# Patient Record
Sex: FEMALE | Race: WHITE | NOT HISPANIC OR LATINO
[De-identification: names, ages, dates, MRNs, and addresses within clinical notes are randomized per-mention and may not be internally consistent; named-entity substitution may affect disease eponyms.]

---

## 2022-04-26 ENCOUNTER — NON-APPOINTMENT (OUTPATIENT)
Age: 67
End: 2022-04-26

## 2022-04-27 ENCOUNTER — TRANSCRIPTION ENCOUNTER (OUTPATIENT)
Age: 67
End: 2022-04-27

## 2022-04-27 ENCOUNTER — NON-APPOINTMENT (OUTPATIENT)
Age: 67
End: 2022-04-27

## 2022-04-27 ENCOUNTER — APPOINTMENT (OUTPATIENT)
Dept: INTERNAL MEDICINE | Facility: CLINIC | Age: 67
End: 2022-04-27
Payer: MEDICARE

## 2022-04-27 VITALS — SYSTOLIC BLOOD PRESSURE: 100 MMHG | DIASTOLIC BLOOD PRESSURE: 60 MMHG

## 2022-04-27 VITALS — HEIGHT: 65 IN | WEIGHT: 132.4 LBS | TEMPERATURE: 98 F | BODY MASS INDEX: 22.06 KG/M2

## 2022-04-27 DIAGNOSIS — G89.29 DORSALGIA, UNSPECIFIED: ICD-10-CM

## 2022-04-27 DIAGNOSIS — R41.0 DISORIENTATION, UNSPECIFIED: ICD-10-CM

## 2022-04-27 DIAGNOSIS — E06.3 AUTOIMMUNE THYROIDITIS: ICD-10-CM

## 2022-04-27 DIAGNOSIS — Z78.9 OTHER SPECIFIED HEALTH STATUS: ICD-10-CM

## 2022-04-27 DIAGNOSIS — M54.9 DORSALGIA, UNSPECIFIED: ICD-10-CM

## 2022-04-27 DIAGNOSIS — E04.1 NONTOXIC SINGLE THYROID NODULE: ICD-10-CM

## 2022-04-27 DIAGNOSIS — Z00.00 ENCOUNTER FOR GENERAL ADULT MEDICAL EXAMINATION W/OUT ABNORMAL FINDINGS: ICD-10-CM

## 2022-04-27 PROCEDURE — G0439: CPT

## 2022-04-27 PROCEDURE — 99203 OFFICE O/P NEW LOW 30 MIN: CPT | Mod: 25

## 2022-04-27 RX ORDER — LEVOTHYROXINE SODIUM 0.03 MG/1
25 TABLET ORAL
Refills: 0 | Status: ACTIVE | COMMUNITY
Start: 2022-04-27

## 2022-04-27 RX ORDER — ASCORBIC ACID 500 MG
TABLET ORAL
Refills: 0 | Status: ACTIVE | COMMUNITY

## 2022-04-27 RX ORDER — CHROMIUM 200 MCG
1000 TABLET ORAL
Refills: 0 | Status: ACTIVE | COMMUNITY

## 2022-04-27 RX ORDER — MULTIVITAMIN
TABLET ORAL
Refills: 0 | Status: ACTIVE | COMMUNITY

## 2022-04-27 RX ORDER — DENOSUMAB 60 MG/ML
60 INJECTION SUBCUTANEOUS
Refills: 0 | Status: ACTIVE | COMMUNITY
Start: 2022-04-27

## 2022-04-27 NOTE — HEALTH RISK ASSESSMENT
[Never] : Never [Yes] : Yes [2 - 4 times a month (2 pts)] : 2-4 times a month (2 points) [1 or 2 (0 pts)] : 1 or 2 (0 points) [Never (0 pts)] : Never (0 points) [No] : In the past 12 months have you used drugs other than those required for medical reasons? No [No falls in past year] : Patient reported no falls in the past year [0] : 2) Feeling down, depressed, or hopeless: Not at all (0) [PHQ-2 Negative - No further assessment needed] : PHQ-2 Negative - No further assessment needed [Patient reported mammogram was normal] : Patient reported mammogram was normal [HIV test declined] : HIV test declined [Hepatitis C test declined] : Hepatitis C test declined [Alone] : lives alone [Retired] : retired [College] : College [] :  [# Of Children ___] : has [unfilled] children [Feels Safe at Home] : Feels safe at home [Reports normal functional visual acuity (ie: able to read med bottle)] : Reports normal functional visual acuity [Smoke Detector] : smoke detector [Carbon Monoxide Detector] : carbon monoxide detector [Safety elements used in home] : safety elements used in home [Seat Belt] :  uses seat belt [Sunscreen] : uses sunscreen [With Patient/Caregiver] : , with patient/caregiver [Patient reported PAP Smear was normal] : Patient reported PAP Smear was normal [Patient reported bone density results were abnormal] : Patient reported bone density results were abnormal [Patient reported colonoscopy was abnormal] : Patient reported colonoscopy was abnormal [de-identified] : socially  [de-identified] : squat,push ups,planks, walks 20min daily core workouts [de-identified] : well balanced  [[id number]] : 0 [Reports changes in hearing] : Reports no changes in hearing [Reports changes in vision] : Reports no changes in vision [Reports changes in dental health] : Reports no changes in dental health [Guns at Home] : no guns at home [Travel to Developing Areas] : does not  travel to developing areas [Caregiver Concerns] : does not have caregiver concerns [MammogramDate] : 01/2022 [MammogramComments] : Jose Armando Shaikh  [PapSmearDate] : 01/2022 [PapSmearComments] : Dr Anneliese Pina  [BoneDensityDate] : 12/2021 [ColonoscopyDate] : 4/15/19 [ColonoscopyComments] : f/u 10 years [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

## 2022-04-27 NOTE — HISTORY OF PRESENT ILLNESS
[FreeTextEntry1] : Establish care [de-identified] : Patient is a 66F with chronic lower back pain (from injury in 1970s) s/p spinal surgery, T9 compression fracture, osteoporosis (on Prolia), Hashimoto's hypothyroidism, thyroid nodule presents today to establish care and to discuss recent neurological episode. \par \par Recently started working with physiatrist Dr. Natalie Sofia conservatively with PT, but she has recommended injections (patient is hoping to hold off on this). Doing exercises at home to strengthen core \par \par Recently had an episode of fluttering in chest, intermittent, very short. No chest pain or SOB when this occurs x years. EKGs normal, but no Holter or Zio\par \par Recently has many ocular migraines (7x in a 3 week period). Last week had a fever 100.7. Felt confused, dissociated for a few hours. No headaches or blurry vision. No memory loss. Slight dizziness. Pressure in the head now\par \par Sees a therapist regularly\par See an osteopath every 2 weeks for spinal adjustments\par \par mammo 1/22\par PAP - 1/22\par colonoscopy- next due, Dr. Parker \par \par pneumonia 23, 13  @[facility]\par shingles @[facility]

## 2022-05-05 ENCOUNTER — TRANSCRIPTION ENCOUNTER (OUTPATIENT)
Age: 67
End: 2022-05-05

## 2022-05-06 ENCOUNTER — NON-APPOINTMENT (OUTPATIENT)
Age: 67
End: 2022-05-06

## 2022-05-06 ENCOUNTER — APPOINTMENT (OUTPATIENT)
Dept: CARDIOLOGY | Facility: CLINIC | Age: 67
End: 2022-05-06
Payer: MEDICARE

## 2022-05-06 VITALS
SYSTOLIC BLOOD PRESSURE: 128 MMHG | OXYGEN SATURATION: 98 % | HEART RATE: 67 BPM | BODY MASS INDEX: 21.97 KG/M2 | WEIGHT: 132 LBS | DIASTOLIC BLOOD PRESSURE: 72 MMHG

## 2022-05-06 DIAGNOSIS — Z87.891 PERSONAL HISTORY OF NICOTINE DEPENDENCE: ICD-10-CM

## 2022-05-06 PROCEDURE — 93246 EXT ECG>7D<15D RECORDING: CPT

## 2022-05-06 PROCEDURE — 99204 OFFICE O/P NEW MOD 45 MIN: CPT

## 2022-05-06 PROCEDURE — 93000 ELECTROCARDIOGRAM COMPLETE: CPT | Mod: 59

## 2022-05-08 PROBLEM — Z87.891 FORMER SMOKER: Status: ACTIVE | Noted: 2022-05-08

## 2022-05-08 NOTE — DISCUSSION/SUMMARY
[FreeTextEntry1] : Chest pain\par The working diagnosis is musculoskeletal chest pain. The history is compelling for this diagnosis. The differential diagnosis would include myocardial ischemia/atherosclerotic heart disease. The resting 5/06/22 electrocardiogram is abnormal consistent with a previous anterior septal wall myocardial infarction of indeterminate age. Prior tracings are not available for comparison. However, the patient does not have multiple risk factors for the development of atherosclerosis. A noninvasive cardiac evaluation would be helpful for management decisions.\par \par I have  recommended the following\par a. Prior cardiac records not available at this time are requested for review\par b. Noninvasive cardiac evaluation to include\par    1. exercise treadmill ECG study\par    2. echocardiogram\par c. further evaluation and treatment will be dictated by the results the above evaluation and the patient's clinical course\par \par \par \par Palpitations\par The working diagnosis is palpitations secondary to sinus tachycardia. The differential diagnosis would include an as yet undetected atrial arrhythmia. In the setting of presumed normal left ventricular systolic function the risk for  the development of a malignant ventricular arrhythmia is low..  An electrocardiogram  during symptoms would be most helpful for diagnosis.\par \par I  have  recommended the following\par a. Echocardiogram\par b  Zio patch 2 week mobile telemetry study\par \par \par \par Hyperlipidemia\par Hyperlipidemia represents a risk factor for atherosclerotic heart disease. The target LDL level for primary prevention is about 100. The ability of high HDL level to provide protection against cardiovascular events is controversial. The main clinical decision involves whether or not to institute antihyperlipidemic medical therapy. Nonpharmacological therapy, specifically diet and exercise are emphasized  as  major aspects of treatment\par \par I have recommended the following\par a. Low fat low cholesterol diet. Regular aerobic exercise to the extent possible\par b. Target LDL level to about 100 as discussed above\par c. Fasting lipid profile is ordered\par d. HMG-CoA reductase inhibitor therapy dependent on the response to above measures  and  the patient's clinical course.\par \par \par \par The diagnosis, prognosis, risks, options and alternatives were explained at length to the patient. All questions were answered. Issues discussed included chest pain palpitations cardiac arrhythmias atherosclerotic heart disease hyperlipidemia noninvasive cardiac testing diet and exercise

## 2022-05-08 NOTE — REVIEW OF SYSTEMS
[Feeling Fatigued] : feeling fatigued [Joint Pain] : joint pain [Negative] : Heme/Lymph [FreeTextEntry5] : see history of present illness [FreeTextEntry9] : back pain

## 2022-05-08 NOTE — PHYSICAL EXAM
[Normal] : normal conjunctiva [Normal S1, S2] : normal S1, S2 [Clear Lung Fields] : clear lung fields [Soft] : abdomen soft [Non Tender] : non-tender [Normal Bowel Sounds] : normal bowel sounds [No Rash] : no rash [No Focal Deficits] : no focal deficits [de-identified] : Appears fit in no distress lying flat [de-identified] : No neck vein distention. No carotid bruit [de-identified] : No murmur. No gallop and no diastolic sounds. [de-identified] : full range of motion [de-identified] : no peripheral edema. Dorsalis pedis pulses +1-2 bilaterally. Feet warm and well-perfused. No ulcerations. [de-identified] : pleasant

## 2022-05-08 NOTE — HISTORY OF PRESENT ILLNESS
[FreeTextEntry1] : 66-year-old female\par Cardiology consultation requested because of palpitations and chest pain\par \par \par Ms. Lock has no known heart disease. There is no history of myocardial infarction angina congestive heart failure or arrhythmia. Approximately 15 years ago she underwent noninvasive cardiac testing including a Holter monitor stress test with myocardial imaging and an echocardiogram. Those studies are not available at this time for review.\par \par Vanessa has chronic back pain resulting from compression fracture and previous spine surgery which prevents her from sitting. She has experienced anterior chest discomfort at rest without associated diaphoresis dyspnea or nausea. Independent of chest pain  Ms. Lock reports palpitations manifested by "butterflies"  There is no history of syncope or exertional chest pain\par \par There is a prior history of mild elevation of the cholesterol in the setting of high HDL levels. A lipid profile is not available at this time for review. There is no history of hypertension diabetes or family history of myocardial infarction/sudden death.  She smoked briefly only as a teenager.\par \par \par Vanessa presents today for cardiovascular evaluation

## 2022-05-17 ENCOUNTER — TRANSCRIPTION ENCOUNTER (OUTPATIENT)
Age: 67
End: 2022-05-17

## 2022-05-26 ENCOUNTER — APPOINTMENT (OUTPATIENT)
Dept: CARDIOLOGY | Facility: CLINIC | Age: 67
End: 2022-05-26
Payer: MEDICARE

## 2022-05-26 DIAGNOSIS — R07.9 CHEST PAIN, UNSPECIFIED: ICD-10-CM

## 2022-05-26 DIAGNOSIS — R00.2 PALPITATIONS: ICD-10-CM

## 2022-05-26 PROCEDURE — 93306 TTE W/DOPPLER COMPLETE: CPT

## 2022-05-26 PROCEDURE — 93246 EXT ECG>7D<15D RECORDING: CPT

## 2022-05-26 PROCEDURE — 36415 COLL VENOUS BLD VENIPUNCTURE: CPT

## 2022-05-30 LAB
CHOLEST SERPL-MCNC: 195 MG/DL
HDLC SERPL-MCNC: 63 MG/DL
LDLC SERPL CALC-MCNC: 114 MG/DL
NONHDLC SERPL-MCNC: 132 MG/DL
TRIGL SERPL-MCNC: 86 MG/DL

## 2022-06-19 ENCOUNTER — NON-APPOINTMENT (OUTPATIENT)
Age: 67
End: 2022-06-19

## 2022-06-20 ENCOUNTER — APPOINTMENT (OUTPATIENT)
Dept: CARDIOLOGY | Facility: CLINIC | Age: 67
End: 2022-06-20
Payer: MEDICARE

## 2022-06-20 PROCEDURE — 93015 CV STRESS TEST SUPVJ I&R: CPT

## 2022-06-22 ENCOUNTER — NON-APPOINTMENT (OUTPATIENT)
Age: 67
End: 2022-06-22

## 2022-06-22 ENCOUNTER — APPOINTMENT (OUTPATIENT)
Dept: NEUROLOGY | Facility: CLINIC | Age: 67
End: 2022-06-22
Payer: MEDICARE

## 2022-06-22 VITALS
TEMPERATURE: 98.7 F | HEART RATE: 91 BPM | OXYGEN SATURATION: 98 % | SYSTOLIC BLOOD PRESSURE: 114 MMHG | DIASTOLIC BLOOD PRESSURE: 76 MMHG | WEIGHT: 130 LBS | HEIGHT: 64.5 IN | BODY MASS INDEX: 21.93 KG/M2

## 2022-06-22 DIAGNOSIS — Z78.9 OTHER SPECIFIED HEALTH STATUS: ICD-10-CM

## 2022-06-22 PROCEDURE — 99205 OFFICE O/P NEW HI 60 MIN: CPT

## 2022-06-22 NOTE — REVIEW OF SYSTEMS
Patient notified script ready for      [As Noted in HPI] : as noted in HPI [Confused or Disoriented] : confusion [Numbness] : numbness [Abnormal Sensation] : an abnormal sensation [Difficulty Walking] : difficulty walking [Negative] : Heme/Lymph [de-identified] : prior suicide attempt in her 20's --- jumped from 4 stories

## 2022-06-22 NOTE — ASSESSMENT
[FreeTextEntry1] : 66 yr old female with remote history of sudden vision loss thought to be ocular migraine now presents with transietn episode of blurry peripheral vision followed by transient confusion during motors tasks. I noted no significant abnormality except for chronic weakness and sensory loss  in LLE attributed to prior injury. The difficulty in motor tasks she had is consistent with an apraxia suggesting cortical dysfunction. Her MRI brain only shows small punctate scattered hyperintensity which would not cause such symptoms \par \par Transient nature of these symptoms suggest this could have been a TIA or possible a complicated migraine. \par I advised the followign workup\par \par 1) screen for other stroke risk factor : large vessel atherosclerosis with MRA brain and MRA neck, atrial flutter or afib with long term heart monitor, will eliminate butter from her diet and recheck LDL in 4-6 weeks but may need low intensity statin to get LDL < 70 in the future, and start low dose aspirin per day\par \par 2) with very regiment diet and some proprioceptive loss on exam I will screen for b12 deficiency and hypothyroidism. \par \par

## 2022-06-22 NOTE — HISTORY OF PRESENT ILLNESS
[FreeTextEntry1] : 66 yr old female with remote history of ocular migraine ( 30 yr ago had episode where she could not see her  standing in front of her) , thoracolumbar traumatic compression fracture with intractable low back pain presents for initial visit after experiencing episode of blurry vision on 3/14/22 for about 30 min. She had a fever that night and felt she was dehydrated the following day. No further elevated temperatures except one in the middle of the night. The following morning was confused and had difficulty with motor tasks. She recalls holding a spoon in her hand and not knowing whether to place it in the sink or the refrigerator. She then went to her laptop and could not figure out how to use it. She became concerned and then went to test her calculation with a game she is use to playing and had no difficulty with that. Overall she felt this difficulty with motor tasks last 2-3 hours. She denies any headache with the episode. The symptoms have not reoccurred. \par \par She adhere to a regiment diet and only eats foods that are antiinflammatory. She monitors her weight and tries to do exercises up the stairs despite her intractable back pain which prevent her from sitting. She can only work standing up or lying down. She had chronic LLE weakness and numbness since her back surgery.

## 2022-06-22 NOTE — CONSULT LETTER
[Dear  ___] : Dear  [unfilled], [Consult Letter:] : I had the pleasure of evaluating your patient, [unfilled]. [( Thank you for referring [unfilled] for consultation for _____ )] : Thank you for referring [unfilled] for consultation for [unfilled] [Please see my note below.] : Please see my note below. [Consult Closing:] : Thank you very much for allowing me to participate in the care of this patient.  If you have any questions, please do not hesitate to contact me. [DrAbdelrahman  ___] : Dr. ROLLINS

## 2022-06-22 NOTE — DATA REVIEWED
[de-identified] : 5-2-22 MRI brain \par brain and ural architecture normal; no restricted diffusion; few small scattered flair hyperintensity in cerebral white matter with cystic focus in left corona radiata; no hemorrhage; normal sinuses; flow voids grossly patent   [de-identified] : 5-26-22 Lipid panel chol 195, tg 86, \par 5-26-22 Holter event monitor: rare successive atrial ectopy noted ; HR sinus  min \par 5-26-22 TTE ; LV, RV size and function normal; LVEF 58%; mild mitral valve regurgitation

## 2022-06-22 NOTE — PHYSICAL EXAM
[FreeTextEntry1] : Mental status:\par Orientation: Alert , oriented to month, day of week, year, location                                                                            Speech is fluent , able to name objects, repeat a sentence and write a sentence                                                  Memory: Short term tested by registering  3 objects and recalled 3/3 in 5 min; Long term memory intact based on correct recall of past events and demographic details.                                                                                     Concentration: able to do serial 7 calculation  5/5 and spell world backwards                                                  Comprehension : able follow 3 step requests                                                                                                         Apraxia and visuospatial able to draw clock drawing and intersecting pentagon                                              Neglect is absent                                                                                                                                                      Agnosia is absent                                                                                                                                         \par MMSE 30/30\par Cranial nerves:\par CN I deferred. CN  II VFF; fundus exam benign; III, IV, VI: PERRLA, EOM IV: Facial sensation normal B/L to touch, pinprick and temperatureVII:Facial strength normal B/L. VIII: Gross hearing intact IX, X: palate midline and elevates symmetrically XI: Trapezius normal strength, XII: Tongue midline without atrophy or fasciculation\par Motor: Muscle tone no rigidity or resistance in all 4 extremities. No atrophy or fasciculation. Muscle strength: arms and legs, proximal and distal flexors and extensors are normal 5/5 except LLE ilipsoas 4/5 and cannot dorsiflex foot due to prior foot drop . No UE drift.\par Sensory: reduce pinprick over dorsum of left foot ; mild loss of propriception at toes \par Reflexes: all present, normal, and symmetrical 1+\par Coordination: finger to nose: normal. Heel to shin: normal\par Gait: steady normal based ; Romberg test negative \par \par

## 2022-07-07 ENCOUNTER — RESULT REVIEW (OUTPATIENT)
Age: 67
End: 2022-07-07

## 2022-07-08 LAB
HCYS SERPL-MCNC: 8 UMOL/L
T4 SERPL-MCNC: 7.7 UG/DL
TSH SERPL-ACNC: 3.42 UIU/ML
VIT B12 SERPL-MCNC: 885 PG/ML

## 2022-07-13 LAB — METHYLMALONATE SERPL-SCNC: 114 NMOL/L

## 2022-07-27 ENCOUNTER — TRANSCRIPTION ENCOUNTER (OUTPATIENT)
Age: 67
End: 2022-07-27

## 2022-08-03 ENCOUNTER — TRANSCRIPTION ENCOUNTER (OUTPATIENT)
Age: 67
End: 2022-08-03

## 2022-08-08 ENCOUNTER — TRANSCRIPTION ENCOUNTER (OUTPATIENT)
Age: 67
End: 2022-08-08

## 2022-08-09 ENCOUNTER — APPOINTMENT (OUTPATIENT)
Dept: CARDIOLOGY | Facility: CLINIC | Age: 67
End: 2022-08-09

## 2022-08-09 VITALS
SYSTOLIC BLOOD PRESSURE: 90 MMHG | HEIGHT: 64.5 IN | WEIGHT: 128 LBS | HEART RATE: 96 BPM | BODY MASS INDEX: 21.59 KG/M2 | OXYGEN SATURATION: 96 % | DIASTOLIC BLOOD PRESSURE: 54 MMHG

## 2022-08-09 DIAGNOSIS — Z01.810 ENCOUNTER FOR PREPROCEDURAL CARDIOVASCULAR EXAMINATION: ICD-10-CM

## 2022-08-09 PROCEDURE — 99214 OFFICE O/P EST MOD 30 MIN: CPT

## 2022-08-09 PROCEDURE — 93000 ELECTROCARDIOGRAM COMPLETE: CPT

## 2022-08-10 ENCOUNTER — NON-APPOINTMENT (OUTPATIENT)
Age: 67
End: 2022-08-10

## 2022-08-10 PROBLEM — Z01.810 PREOPERATIVE CARDIOVASCULAR EXAMINATION: Status: ACTIVE | Noted: 2022-08-10

## 2022-08-10 NOTE — DISCUSSION/SUMMARY
[FreeTextEntry1] : Preoperative cardiovascular examination/  Transient ischemic attack\par \par In 5/22 chest pain and palpitations led to a noninvasive cardiac evaluation. An echocardiogram revealed a left ventricular ejection fraction of 58% and no significant valvular pathology. A stress treadmill ECG study was normal. A two-week Zio patch mobile telemetry study showed sinus rhythm /minute with no arrhythmia..\par \par In 7/22 an episode of confusion and difficulty with motor tasks  led to a neurological evaluation by Dr. Levine.\par An MRA of the neck showed no carotid stenosis. An MRI of the brain showed minimal small vessel ischemic changes. Rosaline was diagnosed as having a transient ischemic attack. Due to concerns for a cardioembolic etiology further evaluation for the presence of atrial fibrillation was recommended. Dr. Levine has  advised Rosaline to undergo long-term telemetry monitoring in the form of an implantable cardiac loop recorder .\par \par  Informed consent was obtained from the patient. Risks including but not limited to infection dislodgment requirements for  repositioning/reoperation were all explained. All questions were answered\par \par I have recommended the following\par a. 8/18/22 implantable cardiac loop recorder\par \par \par \par Hyperlipidemia\par Hyperlipidemia represents a risk factor for atherosclerotic heart disease. There is no evidence of such today. A 6/22 exercise treadmill ECG study was normal.   The resting 8/22 electrocardiogram  shows no evidence of myocardial ischemia or infarction  The target LDL level for primary prevention is about 100. In 5/22 the serum cholesterol level was 195 HDL 63 triglycerides 86 and   The ability of high HDL level to provide protection against cardiovascular events is controversial. The main clinical decision involves whether or not to institute antihyperlipidemic medical therapy. Nonpharmacological therapy, specifically diet and exercise are emphasized  as  major aspects of treatment\par \par I have recommended the following\par a. Low fat low cholesterol diet. Regular aerobic exercise to the extent possible\par b. Target LDL level to about 100 as discussed above\par c. HMG-CoA reductase inhibitor therapy dependent on the response to above measures  and  the patient's clinical course.\par \par \par \par The diagnosis, prognosis, risks, options and alternatives were explained at length to the patient. All questions were answered. Issues discussed included implantable cardiac loop recorder atrial fibrillation   anticoagulation  cardiac arrhythmias atherosclerotic heart disease hyperlipidemia noninvasive cardiac testing diet and exercise\par \par \par \par Counseling and/or coordination of care\par Time was a significant factor for this patient encounter. Total time spent with the patient was 30 minutes. Greater than 50% of the time was devoted to counseling and/or coordination of care .

## 2022-08-10 NOTE — HISTORY OF PRESENT ILLNESS
[FreeTextEntry1] : 67-year-old female\par Preoperative cardiovascular examination\par \par In 5/22 chest pain and palpitations led to a noninvasive cardiac evaluation. An echocardiogram revealed a left ventricular ejection fraction of 58% and no significant valvular pathology. A stress treadmill ECG study was normal. A two-week Zio patch mobile telemetry study showed sinus rhythm /minute with no arrhythmia..\par \par \par \par In 7/22 an episode of confusion and difficulty with motor test led to a neurological evaluation by Dr. Levine.\par An MRA of the neck showed no carotid stenosis. An MRI of the brain showed minimal small vessel ischemic changes. Rosaline was diagnosed as having a transient ischemic attack. Due to concerns for a cardioembolic etiology further evaluation for the presence of atrial fibrillation was recommended. Dr. Levine has  advised Rosaline to undergo long-term telemetry monitoring in the form of an implantable cardiac loop recorder .

## 2022-08-10 NOTE — PHYSICAL EXAM
[Normal] : normal conjunctiva [Normal S1, S2] : normal S1, S2 [Clear Lung Fields] : clear lung fields [Soft] : abdomen soft [Non Tender] : non-tender [Normal Bowel Sounds] : normal bowel sounds [No Rash] : no rash [No Focal Deficits] : no focal deficits [de-identified] : Appears fit in no distress lying flat [de-identified] : No neck vein distention. No carotid bruit [de-identified] : No murmur. No gallop and no diastolic sounds. [de-identified] : full range of motion [de-identified] : no peripheral edema. Dorsalis pedis pulses +1-2 bilaterally. Feet warm and well-perfused. No ulcerations. [de-identified] : pleasant

## 2022-08-12 ENCOUNTER — APPOINTMENT (OUTPATIENT)
Dept: CARDIOLOGY | Facility: CLINIC | Age: 67
End: 2022-08-12

## 2022-08-12 PROCEDURE — 36415 COLL VENOUS BLD VENIPUNCTURE: CPT

## 2022-08-15 ENCOUNTER — RESULT REVIEW (OUTPATIENT)
Age: 67
End: 2022-08-15

## 2022-08-15 LAB
ANION GAP SERPL CALC-SCNC: 13 MMOL/L
BASOPHILS # BLD AUTO: 0.04 K/UL
BASOPHILS NFR BLD AUTO: 0.6 %
BUN SERPL-MCNC: 16 MG/DL
CALCIUM SERPL-MCNC: 10 MG/DL
CHLORIDE SERPL-SCNC: 103 MMOL/L
CO2 SERPL-SCNC: 25 MMOL/L
CREAT SERPL-MCNC: 0.71 MG/DL
EGFR: 93 ML/MIN/1.73M2
EOSINOPHIL # BLD AUTO: 0.06 K/UL
EOSINOPHIL NFR BLD AUTO: 0.9 %
GLUCOSE SERPL-MCNC: 99 MG/DL
HCT VFR BLD CALC: 45.3 %
HGB BLD-MCNC: 13.7 G/DL
IMM GRANULOCYTES NFR BLD AUTO: 0.4 %
INR PPP: 0.99 RATIO
LYMPHOCYTES # BLD AUTO: 2.33 K/UL
LYMPHOCYTES NFR BLD AUTO: 34.4 %
MAN DIFF?: NORMAL
MCHC RBC-ENTMCNC: 30.2 GM/DL
MCHC RBC-ENTMCNC: 30.4 PG
MCV RBC AUTO: 100.4 FL
MONOCYTES # BLD AUTO: 0.39 K/UL
MONOCYTES NFR BLD AUTO: 5.8 %
NEUTROPHILS # BLD AUTO: 3.93 K/UL
NEUTROPHILS NFR BLD AUTO: 57.9 %
PLATELET # BLD AUTO: 211 K/UL
POTASSIUM SERPL-SCNC: 4.9 MMOL/L
PT BLD: 11.5 SEC
RBC # BLD: 4.51 M/UL
RBC # FLD: 14.4 %
SODIUM SERPL-SCNC: 140 MMOL/L
WBC # FLD AUTO: 6.78 K/UL

## 2022-08-18 ENCOUNTER — APPOINTMENT (OUTPATIENT)
Dept: CARDIOLOGY | Facility: CLINIC | Age: 67
End: 2022-08-18

## 2022-08-18 ENCOUNTER — TRANSCRIPTION ENCOUNTER (OUTPATIENT)
Age: 67
End: 2022-08-18

## 2022-09-06 ENCOUNTER — APPOINTMENT (OUTPATIENT)
Dept: CARDIOLOGY | Facility: CLINIC | Age: 67
End: 2022-09-06

## 2022-09-06 VITALS
DIASTOLIC BLOOD PRESSURE: 69 MMHG | WEIGHT: 131 LBS | OXYGEN SATURATION: 96 % | HEART RATE: 88 BPM | SYSTOLIC BLOOD PRESSURE: 105 MMHG | HEIGHT: 64.5 IN | BODY MASS INDEX: 22.09 KG/M2

## 2022-09-06 PROCEDURE — 99213 OFFICE O/P EST LOW 20 MIN: CPT

## 2022-09-06 PROCEDURE — 93290 INTERROG DEV EVAL ICPMS IP: CPT

## 2022-09-07 ENCOUNTER — APPOINTMENT (OUTPATIENT)
Dept: NEUROLOGY | Facility: CLINIC | Age: 67
End: 2022-09-07

## 2022-09-07 VITALS
DIASTOLIC BLOOD PRESSURE: 69 MMHG | HEIGHT: 64.5 IN | WEIGHT: 131 LBS | HEART RATE: 66 BPM | SYSTOLIC BLOOD PRESSURE: 110 MMHG | BODY MASS INDEX: 22.09 KG/M2

## 2022-09-07 DIAGNOSIS — Z80.0 FAMILY HISTORY OF MALIGNANT NEOPLASM OF DIGESTIVE ORGANS: ICD-10-CM

## 2022-09-07 DIAGNOSIS — Z83.3 FAMILY HISTORY OF DIABETES MELLITUS: ICD-10-CM

## 2022-09-07 DIAGNOSIS — Z80.3 FAMILY HISTORY OF MALIGNANT NEOPLASM OF BREAST: ICD-10-CM

## 2022-09-07 DIAGNOSIS — Z84.89 FAMILY HISTORY OF OTHER SPECIFIED CONDITIONS: ICD-10-CM

## 2022-09-07 DIAGNOSIS — Z63.5 DISRUPTION OF FAMILY BY SEPARATION AND DIVORCE: ICD-10-CM

## 2022-09-07 DIAGNOSIS — Z78.9 OTHER SPECIFIED HEALTH STATUS: ICD-10-CM

## 2022-09-07 DIAGNOSIS — Z80.7 FAMILY HISTORY OF OTHER MALIGNANT NEOPLASMS OF LYMPHOID, HEMATOPOIETIC AND RELATED TISSUES: ICD-10-CM

## 2022-09-07 PROCEDURE — 99215 OFFICE O/P EST HI 40 MIN: CPT

## 2022-09-07 SDOH — SOCIAL STABILITY - SOCIAL INSECURITY: DISRUPTION OF FAMILY BY SEPARATION AND DIVORCE: Z63.5

## 2022-09-08 NOTE — DISCUSSION/SUMMARY
[FreeTextEntry1] : Transient ischemic attack\par In 7/22 an episode of confusion and difficulty with motor tasks led to a neurological evaluation by Dr. Levine. An MRA of the neck showed no carotid stenosis an MRI of the brain showed minimal small vessel ischemic changes. Rosaline was diagnosed as having a transient ischemic attack.   A  5/22  Zio  patch telemetry study showed sinus rhythm /minute. No arrhythmia was seen. Due to concerns for a cardioembolic etiology an implantable cardiac loop recorder was inserted in 8/22. Interrogation of the  device today shows normal sinus rhythm. No arrhythmia. No atrial fibrillation is detected.  Anticoagulation  is not  indicated in the absence of documented  atrial  fibrillation.\par \par I  have  recommended the following:\par a.re-interrogation of the implantable cardiac loop recorder in 3 months\par \par \par Hyperlipidemia\par Hyperlipidemia represents a risk factor for atherosclerotic heart disease. There is no evidence of such today. A 6/22 exercise treadmill ECG study was normal.   The resting 8/22 electrocardiogram  shows no evidence of myocardial ischemia or infarction  The target LDL level for primary prevention is about 100. In 5/22 the serum cholesterol level was 195 HDL 63 triglycerides 86 and   The ability of high HDL level to provide protection against cardiovascular events is controversial. The main clinical decision involves whether or not to institute antihyperlipidemic medical therapy. Nonpharmacological therapy, specifically diet and exercise are emphasized  as  major aspects of treatment\par \par I have recommended the following\par a. Low fat low cholesterol diet. Regular aerobic exercise to the extent possible\par b. Target LDL level to about 100 as discussed above\par c. HMG-CoA reductase inhibitor therapy dependent on the response to above measures  and  the patient's clinical course.\par d. Routine laboratory studies including lipid profile  through  primary care Dr. Soliz\par \par \par \par \par Valvular heart disease\par The 5/22 echocardiogram revealed mild mitral insufficiency. The pulmonary systolic pressure was normal at 22 mmHg. The  left  ventricular  ejection fraction was 58%. The present cardiac physical examination is not suggestive of hemodynamically significant valvular pathology. In view of the lack of symptoms, absence of heart failure and clinical course continued monitoring without intervention is indicated this time.\par \par I recommended the following\par a  no further cardiac testing for this problem at this time.\par \par \par \par \par The diagnosis, prognosis, risks, options and alternatives were explained at length to the patient. All questions were answered. Issues discussed included implantable cardiac loop recorder atrial fibrillation   anticoagulation  cardiac arrhythmias atherosclerotic heart disease hyperlipidemia noninvasive cardiac testing diet and exercise\par \par \par \par Counseling and/or coordination of care\par Time was a significant factor for this patient encounter. Total time spent with the patient was  25  minutes. Greater than 50% of the time was devoted to counseling and/or coordination of care .

## 2022-09-08 NOTE — REVIEW OF SYSTEMS
[Feeling Fatigued] : feeling fatigued [Negative] : Heme/Lymph [FreeTextEntry5] : see history of present illness [FreeTextEntry9] : back pain

## 2022-09-08 NOTE — HISTORY OF PRESENT ILLNESS
[FreeTextEntry1] : 67-year-old female\par Routine followup hospital\par Patient underwent implantation of an implantable cardiac loop recorder in  8/22 without complications. She presently denies symptoms of chest pain palpitations shortness of breath or syncope.

## 2022-09-08 NOTE — PHYSICAL EXAM
[Normal] : normal conjunctiva [Normal S1, S2] : normal S1, S2 [Clear Lung Fields] : clear lung fields [Soft] : abdomen soft [Non Tender] : non-tender [Normal Bowel Sounds] : normal bowel sounds [No Rash] : no rash [No Focal Deficits] : no focal deficits [de-identified] : Appears fit in no distress lying flat [de-identified] : No neck vein distention. No carotid bruit [de-identified] : No murmur. No gallop and no diastolic sounds. [de-identified] : full range of motion.  Implantable loop recorder palpable left sternal border. Incision site healed [de-identified] : no peripheral edema. Dorsalis pedis pulses +1-2 bilaterally. Feet warm and well-perfused. No ulcerations. [de-identified] : pleasant

## 2022-09-09 NOTE — HISTORY OF PRESENT ILLNESS
[FreeTextEntry1] : 66 yr old female with remote history of ocular migraine ( 30 yr ago had episode where she could not see her  standing in front of her) , thoracolumbar traumatic compression fracture with intractable low back pain presents for initial visit after experiencing episode of blurry vision on 3/14/22 for about 30 min. She had a fever that night and felt she was dehydrated the following day. No further elevated temperatures except one in the middle of the night. The following morning was confused and had difficulty with motor tasks. She recalls holding a spoon in her hand and not knowing whether to place it in the sink or the refrigerator. She then went to her laptop and could not figure out how to use it. She became concerned and then went to test her calculation with a game she is use to playing and had no difficulty with that. Overall she felt this difficulty with motor tasks last 2-3 hours. She denies any headache with the episode. The symptoms have not reoccurred. \par \par She adhere to a regiment diet and only eats foods that are antiinflammatory. She monitors her weight and tries to do exercises up the stairs despite her intractable back pain which prevent her from sitting. She can only work standing up or lying down. She had chronic LLE weakness and numbness since her back surgery. \par \par Since last visit no further transient neurologic events\par She has zio patch that showed some atrial ectopy so now has ILR since Aug 22\par She did not start asprin \par She has made modifications to her diet but has not incorporated daily exercise

## 2022-09-09 NOTE — ASSESSMENT
[FreeTextEntry1] : 66 yr old female with remote history of sudden vision loss thought to be ocular migraine now presents with transietn episode of blurry peripheral vision followed by transient confusion during motors tasks. I noted no significant abnormality except for chronic weakness and sensory loss  in LLE attributed to prior injury. The difficulty in motor tasks she had is consistent with an apraxia suggesting cortical dysfunction. Her MRI brain only shows small punctate scattered hyperintensity which would not cause such symptoms \par \par Transient nature of these symptoms suggest this could have been a TIA or possible a complicated migraine. \par I advised the following workup\par \par 1) start aspirin 8 1mg per day\par 2) continue ILR montioring\par 3) MRA brain since no hemodynamic stenosis noted on MRA neck \par 4) recheck Lipid panel and if > 70 then will discuss adding statin \par \par

## 2022-09-09 NOTE — DATA REVIEWED
[de-identified] : 5-2-22 MRI brain \par brain and ural architecture normal; no restricted diffusion; few small scattered flair hyperintensity in cerebral white matter with cystic focus in left corona radiata; no hemorrhage; normal sinuses; flow voids grossly patent  \par \par 7/7/22 MRA neck no carotid stenosis  [de-identified] : 5-26-22 Lipid panel chol 195, tg 86, \par 5-26-22 Holter event monitor: rare successive atrial ectopy noted ; HR sinus  min \par 5-26-22 TTE ; LV, RV size and function normal; LVEF 58%; mild mitral valve regurgitation \par 6/22/22 B12 883

## 2022-09-09 NOTE — REVIEW OF SYSTEMS
[As Noted in HPI] : as noted in HPI [Confused or Disoriented] : confusion [Numbness] : numbness [Abnormal Sensation] : an abnormal sensation [Difficulty Walking] : difficulty walking [Negative] : Heme/Lymph [de-identified] : prior suicide attempt in her 20's --- jumped from 4 stories

## 2022-09-12 NOTE — DATA REVIEWED
[de-identified] : 5-2-22 MRI brain \par brain and ural architecture normal; no restricted diffusion; few small scattered flair hyperintensity in cerebral white matter with cystic focus in left corona radiata; no hemorrhage; normal sinuses; flow voids grossly patent   [de-identified] : 5-26-22 Lipid panel chol 195, tg 86, \par 5-26-22 Holter event monitor: rare successive atrial ectopy noted ; HR sinus  min \par 5-26-22 TTE ; LV, RV size and function normal; LVEF 58%; mild mitral valve regurgitation

## 2022-10-28 DIAGNOSIS — H53.129 TRANSIENT VISUAL LOSS, UNSPECIFIED EYE: ICD-10-CM

## 2022-10-28 DIAGNOSIS — H53.9 UNSPECIFIED VISUAL DISTURBANCE: ICD-10-CM

## 2022-10-30 ENCOUNTER — RESULT REVIEW (OUTPATIENT)
Age: 67
End: 2022-10-30

## 2022-11-14 LAB
CHOLEST SERPL-MCNC: 193 MG/DL
HDLC SERPL-MCNC: 69 MG/DL
LDLC SERPL CALC-MCNC: 108 MG/DL
NONHDLC SERPL-MCNC: 124 MG/DL
TRIGL SERPL-MCNC: 78 MG/DL

## 2022-11-16 ENCOUNTER — APPOINTMENT (OUTPATIENT)
Dept: NEUROLOGY | Facility: CLINIC | Age: 67
End: 2022-11-16

## 2022-11-16 VITALS
HEIGHT: 64.5 IN | BODY MASS INDEX: 21.93 KG/M2 | WEIGHT: 130 LBS | HEART RATE: 98 BPM | DIASTOLIC BLOOD PRESSURE: 68 MMHG | SYSTOLIC BLOOD PRESSURE: 104 MMHG | OXYGEN SATURATION: 96 %

## 2022-11-16 VITALS
HEIGHT: 64.5 IN | SYSTOLIC BLOOD PRESSURE: 104 MMHG | DIASTOLIC BLOOD PRESSURE: 68 MMHG | BODY MASS INDEX: 21.93 KG/M2 | WEIGHT: 130 LBS

## 2022-11-16 DIAGNOSIS — M51.9 UNSPECIFIED THORACIC, THORACOLUMBAR AND LUMBOSACRAL INTERVERTEBRAL DISC DISORDER: ICD-10-CM

## 2022-11-16 DIAGNOSIS — G43.109 MIGRAINE WITH AURA, NOT INTRACTABLE, W/OUT STATUS MIGRAINOSUS: ICD-10-CM

## 2022-11-16 DIAGNOSIS — S32.000A WEDGE COMPRESSION FRACTURE OF UNSPECIFIED LUMBAR VERTEBRA, INITIAL ENCOUNTER FOR CLOSED FRACTURE: ICD-10-CM

## 2022-11-16 DIAGNOSIS — Z86.39 PERSONAL HISTORY OF OTHER ENDOCRINE, NUTRITIONAL AND METABOLIC DISEASE: ICD-10-CM

## 2022-11-16 DIAGNOSIS — Z86.79 PERSONAL HISTORY OF OTHER DISEASES OF THE CIRCULATORY SYSTEM: ICD-10-CM

## 2022-11-16 PROCEDURE — 99214 OFFICE O/P EST MOD 30 MIN: CPT

## 2022-11-16 RX ORDER — KRILL/OM-3/DHA/EPA/PHOSPHO/AST 1000-230MG
81 CAPSULE ORAL
Refills: 0 | Status: ACTIVE | COMMUNITY

## 2022-11-16 RX ORDER — CYCLOSPORINE 0.5 MG/ML
0.05 EMULSION OPHTHALMIC
Refills: 0 | Status: ACTIVE | COMMUNITY

## 2022-11-16 NOTE — DATA REVIEWED
[de-identified] : 5-2-22 MRI brain \par brain and ural architecture normal; no restricted diffusion; few small scattered flair hyperintensity in cerebral white matter with cystic focus in left corona radiata; no hemorrhage; normal sinuses; flow voids grossly patent  \par \par 7/7/22 MRA neck no carotid stenosis  [de-identified] : 5-26-22 Lipid panel chol 195, tg 86, \par 5-26-22 Holter event monitor: rare successive atrial ectopy noted ; HR sinus  min \par 5-26-22 TTE ; LV, RV size and function normal; LVEF 58%; mild mitral valve regurgitation \par 6/22/22 B12 883\par 11-10-22 Lipid panel , chol 193 HDL 69\par \par ILR reports thus far no afib or flutter seen

## 2022-11-16 NOTE — REVIEW OF SYSTEMS
[As Noted in HPI] : as noted in HPI [Confused or Disoriented] : confusion [Numbness] : numbness [Abnormal Sensation] : an abnormal sensation [Difficulty Walking] : difficulty walking [Negative] : Heme/Lymph [de-identified] : prior suicide attempt in her 20's --- jumped from 4 stories

## 2022-11-16 NOTE — PHYSICAL EXAM
[FreeTextEntry1] : Mental status:\par Orientation: Alert , oriented to month, day of week, year, location                                                                            Speech is fluent , able to name objects, repeat a sentence and write a sentence                                                  Memory: Short term tested by registering  3 objects and recalled 3/3 in 5 min; Long term memory intact based on correct recall of past events and demographic details.                                                                                     Concentration: able to do serial 7 calculation  5/5 and spell world backwards                                                  Comprehension : able follow 3 step requests                                                                                                         Apraxia and visuospatial able to draw clock drawing and intersecting pentagon                                              Neglect is absent                                                                                                                                                      Agnosia is absent                                                                                                                                         \par MMSE 30/30\par Cranial nerves:\par CN I deferred. CN  II VFF; fundus exam benign; III, IV, VI: PERRLA, EOM IV: Facial sensation normal B/L to touch, pinprick and temperature VII:Facial strength normal B/L. VIII: Gross hearing intact IX, X: palate midline and elevates symmetrically XI: Trapezius normal strength, XII: Tongue midline without atrophy or fasciculation\par Motor: Muscle tone no rigidity or resistance in all 4 extremities. No atrophy or fasciculation. Muscle strength: arms and legs, proximal and distal flexors and extensors are normal 5/5 except LLE ilipsoas 4/5 and cannot dorsiflex foot due to prior foot drop . No UE drift.\par Sensory: reduce pinprick over dorsum of left foot ; mild loss of proprioception at toes \par Reflexes: all present, normal, and symmetrical 1+\par Coordination: finger to nose: normal. Heel to shin: normal\par Gait: steady normal based ; Romberg test negative \par \par

## 2022-11-16 NOTE — HISTORY OF PRESENT ILLNESS
[FreeTextEntry1] : Patient presents for follow up for stroke prevention\par Dec 16 had 15 min episode of dizziness that came on while in bed and then she noted she felt like she was on about. The Symptoms resolved in 15 min. Denies any additional focal deficits with this event. \par She is compliant with aspirin and reports no side effects \par

## 2022-11-16 NOTE — ASSESSMENT
[FreeTextEntry1] : 66 yr old female with remote history of sudden vision loss thought to be ocular migraine now presents with transient episode of blurry peripheral vision followed by transient confusion during motors tasks. I noted no significant abnormality except for chronic weakness and sensory loss  in LLE attributed to prior injury. The difficulty in motor tasks she had is consistent with an apraxia suggesting cortical dysfunction. Her MRI brain only shows small punctate scattered hyperintensity which would not cause such symptoms \par \par Transient nature of these symptoms suggest this could have been a TIA or possible a complicated migraine. \par I advised the following workup\par \par 1) continue aspirin \par 2) no atherosclerosis disease noted in intracranial or extracranial vessels\par 3) continue ILR to look for afib/flutter\par 4)  in patient with no atherosclerosis or cardiac disease --- will check Lp(a) level to see if we should be more aggressive \par

## 2023-03-01 ENCOUNTER — APPOINTMENT (OUTPATIENT)
Dept: NEUROLOGY | Facility: CLINIC | Age: 68
End: 2023-03-01
Payer: MEDICARE

## 2023-03-01 VITALS
HEART RATE: 101 BPM | DIASTOLIC BLOOD PRESSURE: 66 MMHG | HEIGHT: 64.5 IN | WEIGHT: 130 LBS | SYSTOLIC BLOOD PRESSURE: 100 MMHG | BODY MASS INDEX: 21.93 KG/M2

## 2023-03-01 DIAGNOSIS — Z86.73 PERSONAL HISTORY OF TRANSIENT ISCHEMIC ATTACK (TIA), AND CEREBRAL INFARCTION W/OUT RESIDUAL DEFICITS: ICD-10-CM

## 2023-03-01 PROCEDURE — 99215 OFFICE O/P EST HI 40 MIN: CPT

## 2023-03-01 RX ORDER — COPPER GLUCONATE 2 MG
TABLET ORAL
Refills: 0 | Status: ACTIVE | COMMUNITY

## 2023-03-01 RX ORDER — EZETIMIBE 10 MG/1
10 TABLET ORAL
Qty: 30 | Refills: 2 | Status: ACTIVE | COMMUNITY
Start: 2023-03-01 | End: 1900-01-01

## 2023-03-01 RX ORDER — MAGNESIUM OXIDE/MAG AA CHELATE 300 MG
CAPSULE ORAL
Refills: 0 | Status: ACTIVE | COMMUNITY

## 2023-03-01 NOTE — ASSESSMENT
[FreeTextEntry1] : 66 yr old female with remote history of sudden vision loss thought to be ocular migraine now presents with transietn episode of blurry peripheral vision followed by transient confusion during motors tasks. I noted no significant abnormality except for chronic weakness and sensory loss  in LLE attributed to prior injury. The difficulty in motor tasks she had is consistent with an apraxia suggesting cortical dysfunction. Her MRI brain only shows small punctate scattered hyperintensity which would not cause such symptoms \par \par Transient nature of these symptoms suggest this could have been a TIA or possible a complicated migraine. \par I advised the following workup\par \par 1) start aspirin 81 mg per day\par 2) continue ILR monitoring\par 3) LDL remains moderately elevated and advised starting low intensity statin or zetia at this time. \par \par

## 2023-03-01 NOTE — REVIEW OF SYSTEMS
[As Noted in HPI] : as noted in HPI [Confused or Disoriented] : confusion [Numbness] : numbness [Abnormal Sensation] : an abnormal sensation [Difficulty Walking] : difficulty walking [Negative] : Heme/Lymph [de-identified] : prior suicide attempt in her 20's --- jumped from 4 stories

## 2023-03-01 NOTE — DATA REVIEWED
[de-identified] : 5-2-22 MRI brain \par brain architecture normal; no restricted diffusion; few small scattered flair hyperintensity in cerebral white matter with cystic focus in left corona radiata; no hemorrhage; normal sinuses; flow voids grossly patent  \par \par 7/7/22 MRA neck no carotid stenosis \par 10/30/22 MRA brain : no intracranial stenosis or vascular malformation  [de-identified] : 5-26-22 Lipid panel chol 195, tg 86, \par 5-26-22 Holter event monitor: rare successive atrial ectopy noted ; HR sinus  min \par 5-26-22 TTE ; LV, RV size and function normal; LVEF 58%; mild mitral valve regurgitation \par 6/22/22 B12 883\par \par 2/23/23 chol 193, , HDL 59; HbA1C 5.0; + BOBBY titer 1:80 cytoplasmic pattern

## 2023-03-01 NOTE — HISTORY OF PRESENT ILLNESS
[FreeTextEntry1] : 66 yr old female with remote history of ocular migraine ( 30 yr ago had episode where she could not see her  standing in front of her) , thoracolumbar traumatic compression fracture with intractable low back pain presents for initial visit after experiencing episode of blurry vision on 3/14/22 for about 30 min. She had a fever that night and felt she was dehydrated the following day. No further elevated temperatures except one in the middle of the night. The following morning was confused and had difficulty with motor tasks. She recalls holding a spoon in her hand and not knowing whether to place it in the sink or the refrigerator. She then went to her laptop and could not figure out how to use it. She became concerned and then went to test her calculation with a game she is use to playing and had no difficulty with that. Overall she felt this difficulty with motor tasks last 2-3 hours. She denies any headache with the episode. The symptoms have not reoccurred. \par \par She adhere to a regiment diet and only eats foods that are antiinflammatory. She monitors her weight and tries to do exercises up the stairs despite her intractable back pain which prevent her from sitting. She can only work standing up or lying down. She had chronic LLE weakness and numbness since her back surgery. \par \par Since last visit no further transient neurologic events\par She has zio patch that showed some atrial ectopy so now has ILR since Aug 22 which has not shown any arrhythmia thus far \par She resumed  aspirin \par She has made modifications to her diet but has not incorporated daily exercise

## 2023-04-15 ENCOUNTER — APPOINTMENT (OUTPATIENT)
Dept: INTERNAL MEDICINE | Facility: CLINIC | Age: 68
End: 2023-04-15
Payer: MEDICARE

## 2023-04-15 VITALS
OXYGEN SATURATION: 98 % | BODY MASS INDEX: 21.93 KG/M2 | HEART RATE: 90 BPM | DIASTOLIC BLOOD PRESSURE: 70 MMHG | HEIGHT: 64.5 IN | SYSTOLIC BLOOD PRESSURE: 110 MMHG | WEIGHT: 130 LBS

## 2023-04-15 DIAGNOSIS — G47.00 INSOMNIA, UNSPECIFIED: ICD-10-CM

## 2023-04-15 PROCEDURE — 99214 OFFICE O/P EST MOD 30 MIN: CPT

## 2023-04-15 NOTE — REVIEW OF SYSTEMS
[Sore Throat] : sore throat [Joint Pain] : joint pain [Muscle Pain] : muscle pain [Back Pain] : back pain

## 2023-04-16 PROBLEM — G47.00 INSOMNIA: Status: ACTIVE | Noted: 2023-04-16

## 2023-04-16 NOTE — PHYSICAL EXAM
[Normal Sclera/Conjunctiva] : normal sclera/conjunctiva [Normal Outer Ear/Nose] : the outer ears and nose were normal in appearance [Normal TMs] : both tympanic membranes were normal [No Lymphadenopathy] : no lymphadenopathy [Thyroid Normal, No Nodules] : the thyroid was normal and there were no nodules present [No Edema] : there was no peripheral edema [Normal] : affect was normal and insight and judgment were intact [de-identified] : mild throat erythema

## 2023-04-16 NOTE — HEALTH RISK ASSESSMENT
[Yes] : Yes [2 - 4 times a month (2 pts)] : 2-4 times a month (2 points) [1 or 2 (0 pts)] : 1 or 2 (0 points) [Never (0 pts)] : Never (0 points) [No] : In the past 12 months have you used drugs other than those required for medical reasons? No [No falls in past year] : Patient reported no falls in the past year [0] : 2) Feeling down, depressed, or hopeless: Not at all (0) [PHQ-2 Negative - No further assessment needed] : PHQ-2 Negative - No further assessment needed [With Patient/Caregiver] : , with patient/caregiver [Former] : Former [Audit-CScore] : 2 [de-identified] : socially  [de-identified] : squat,push ups,planks, walks 20min daily core workouts [de-identified] : well balanced  [TIZ9Cdhpy] : 0 [AdvancecareDate] : 4/01/2022

## 2023-04-16 NOTE — HISTORY OF PRESENT ILLNESS
[de-identified] : Patient is a 67F with chronic lower back pain (from injury in 1970s) s/p spinal surgery, T9 compression fracture, osteoporosis (on Prolia), Hashimoto's hypothyroidism, thyroid nodule presents today with a few questions:\par \par 1- Recent + BOBBY with Dr. Uriarte - has hx of Hashimoto's. For the last 2 months has been experiencing joint pain and muscle pain when working out on certain days, other days feels ok\par \par 2- Cholesterol - was recommended to take ezetimibe for possible TIA, reluctant to take this or any medication\par LDL is 114 in 2/2023 without really exercising. Will start to exercise more and follow cholesterol levels. \par \par 3- stomach pain - patient has linked this to increased portions from "emotional eating" For the last 2 weeks has cut down on portion size and has noticed an improvement in the pain\par \par 4- sleep - always an issue, takes melatonin occasionally\par \par \par \par \par

## 2023-05-02 ENCOUNTER — APPOINTMENT (OUTPATIENT)
Dept: RHEUMATOLOGY | Facility: CLINIC | Age: 68
End: 2023-05-02
Payer: MEDICARE

## 2023-05-02 VITALS
DIASTOLIC BLOOD PRESSURE: 70 MMHG | BODY MASS INDEX: 22.77 KG/M2 | HEIGHT: 64.5 IN | SYSTOLIC BLOOD PRESSURE: 126 MMHG | HEART RATE: 73 BPM | OXYGEN SATURATION: 98 % | WEIGHT: 135 LBS

## 2023-05-02 DIAGNOSIS — M25.50 PAIN IN UNSPECIFIED JOINT: ICD-10-CM

## 2023-05-02 DIAGNOSIS — R76.8 OTHER SPECIFIED ABNORMAL IMMUNOLOGICAL FINDINGS IN SERUM: ICD-10-CM

## 2023-05-02 DIAGNOSIS — M81.0 AGE-RELATED OSTEOPOROSIS W/OUT CURRENT PATHOLOGICAL FRACTURE: ICD-10-CM

## 2023-05-02 PROCEDURE — 99204 OFFICE O/P NEW MOD 45 MIN: CPT

## 2023-05-02 NOTE — HISTORY OF PRESENT ILLNESS
[FreeTextEntry1] : 68yo F with chronic back pain, s/p trauma + back fusion surgery, osteoporosis in the office for evaluation\par \par \par History:\par patient presented to endo with complains of hair loss, arthralgias\par as part of the workup + BOBBY\par PCP referral to rheum\par BOBBY 1: 40 titer\par patient reports occasional arthralgias\par for a period hair loss improved after raising dose of levothyroxine\par \par \par ROS\par recent viral illness, description of a form of otitis requiring ENT eval. now much better\par no synovitis\par no dactylitis\par no malar rash\par no other form of skin rashes\par no raynayds\par no sicca symptoms described

## 2023-05-02 NOTE — PHYSICAL EXAM
[General Appearance - Alert] : alert [General Appearance - In No Acute Distress] : in no acute distress [Sclera] : the sclera and conjunctiva were normal [Outer Ear] : the ears and nose were normal in appearance [Neck Appearance] : the appearance of the neck was normal [] : no respiratory distress [Nail Clubbing] : no clubbing  or cyanosis of the fingernails [Musculoskeletal - Swelling] : no joint swelling seen [Motor Tone] : muscle strength and tone were normal [No Focal Deficits] : no focal deficits [Oriented To Time, Place, And Person] : oriented to person, place, and time [FreeTextEntry1] : straight leg negative

## 2023-05-03 LAB
C3 SERPL-MCNC: 90 MG/DL
C4 SERPL-MCNC: 13 MG/DL
CCP AB SER IA-ACNC: <8 UNITS
CENTROMERE IGG SER-ACNC: <0.2 AL
DSDNA AB SER-ACNC: 18 IU/ML
ENA SCL70 IGG SER IA-ACNC: <0.2 AL
ENA SS-A AB SER IA-ACNC: <0.2 AL
ENA SS-B AB SER IA-ACNC: <0.2 AL
RF+CCP IGG SER-IMP: NEGATIVE
RHEUMATOID FACT SER QL: <10 IU/ML

## 2024-01-04 ENCOUNTER — APPOINTMENT (OUTPATIENT)
Dept: FAMILY MEDICINE | Facility: CLINIC | Age: 69
End: 2024-01-04
Payer: MEDICARE

## 2024-01-04 VITALS
TEMPERATURE: 98.4 F | HEIGHT: 64.5 IN | BODY MASS INDEX: 22.43 KG/M2 | HEART RATE: 111 BPM | DIASTOLIC BLOOD PRESSURE: 78 MMHG | OXYGEN SATURATION: 96 % | SYSTOLIC BLOOD PRESSURE: 110 MMHG | WEIGHT: 133 LBS

## 2024-01-04 DIAGNOSIS — R10.9 UNSPECIFIED ABDOMINAL PAIN: ICD-10-CM

## 2024-01-04 PROCEDURE — 99214 OFFICE O/P EST MOD 30 MIN: CPT

## 2024-01-04 NOTE — HEALTH RISK ASSESSMENT
[Yes] : Yes [2 - 4 times a month (2 pts)] : 2-4 times a month (2 points) [1 or 2 (0 pts)] : 1 or 2 (0 points) [Never (0 pts)] : Never (0 points) [No] : In the past 12 months have you used drugs other than those required for medical reasons? No [No falls in past year] : Patient reported no falls in the past year [0] : 2) Feeling down, depressed, or hopeless: Not at all (0) [PHQ-2 Negative - No further assessment needed] : PHQ-2 Negative - No further assessment needed [Former] : Former [de-identified] : socially  [Audit-CScore] : 2 [de-identified] : squat,push ups,planks, walks 20min daily core workouts [de-identified] : well balanced  [KRE8Cjlnq] : 0

## 2024-01-04 NOTE — REVIEW OF SYSTEMS
[Abdominal Pain] : abdominal pain [Constipation] : constipation [Fever] : no fever [Chills] : no chills [Fatigue] : no fatigue [Night Sweats] : no night sweats [Chest Pain] : no chest pain [Palpitations] : no palpitations [Shortness Of Breath] : no shortness of breath [Wheezing] : no wheezing [Cough] : no cough [Nausea] : no nausea [Diarrhea] : diarrhea [Vomiting] : no vomiting [Headache] : no headache

## 2024-01-04 NOTE — PHYSICAL EXAM
[No Acute Distress] : no acute distress [Normal Voice/Communication] : normal voice/communication [EOMI] : extraocular movements intact [Supple] : supple [No Respiratory Distress] : no respiratory distress  [No Accessory Muscle Use] : no accessory muscle use [Soft] : abdomen soft [Coordination Grossly Intact] : coordination grossly intact [Speech Grossly Normal] : speech grossly normal [de-identified] :  neg mcburney, neg hill, neg rebound, neg psoas; tender to palpate in the LUQ and LLQ, no masses noted

## 2024-01-04 NOTE — HISTORY OF PRESENT ILLNESS
[FreeTextEntry8] : 68 year old  F  presents c/o increasing abd pain since 9/2023  Denies any recent injuries or trauma to the area or ever having pain like this before. Pain is located LUQ, rates the pain sometimes at night 10/10 She report having fevers and dizziness when the pain occurs Denies any recent change to diet, restaurant. Bowel movements: had pain when she was not constipated, LBM: today, normal Urination: no issues or urinary sx Takes TUMS which helps the pain temporarily. Denies fatigue, night sweat, unintentional weight loss. Advised pt go to ED for evaluation.

## 2024-01-08 ENCOUNTER — TRANSCRIPTION ENCOUNTER (OUTPATIENT)
Age: 69
End: 2024-01-08

## 2024-01-29 ENCOUNTER — RESULT CHARGE (OUTPATIENT)
Age: 69
End: 2024-01-29

## 2024-01-31 ENCOUNTER — NON-APPOINTMENT (OUTPATIENT)
Age: 69
End: 2024-01-31

## 2024-01-31 ENCOUNTER — APPOINTMENT (OUTPATIENT)
Dept: CARDIOLOGY | Facility: CLINIC | Age: 69
End: 2024-01-31
Payer: MEDICARE

## 2024-01-31 VITALS
SYSTOLIC BLOOD PRESSURE: 122 MMHG | HEART RATE: 73 BPM | HEIGHT: 64.5 IN | OXYGEN SATURATION: 95 % | DIASTOLIC BLOOD PRESSURE: 63 MMHG | BODY MASS INDEX: 22.43 KG/M2 | WEIGHT: 133 LBS

## 2024-01-31 DIAGNOSIS — I38 ENDOCARDITIS, VALVE UNSPECIFIED: ICD-10-CM

## 2024-01-31 DIAGNOSIS — Z86.73 PERSONAL HISTORY OF TRANSIENT ISCHEMIC ATTACK (TIA), AND CEREBRAL INFARCTION W/OUT RESIDUAL DEFICITS: ICD-10-CM

## 2024-01-31 DIAGNOSIS — E78.5 HYPERLIPIDEMIA, UNSPECIFIED: ICD-10-CM

## 2024-01-31 PROCEDURE — 93000 ELECTROCARDIOGRAM COMPLETE: CPT

## 2024-01-31 PROCEDURE — 36415 COLL VENOUS BLD VENIPUNCTURE: CPT

## 2024-01-31 PROCEDURE — 99213 OFFICE O/P EST LOW 20 MIN: CPT

## 2024-01-31 PROCEDURE — 93290 INTERROG DEV EVAL ICPMS IP: CPT

## 2024-01-31 NOTE — DISCUSSION/SUMMARY
[FreeTextEntry1] : Transient ischemic attack/ Implantable cardiac loop recorder In 7/22 an episode of confusion and difficulty with motor tasks led to a neurological evaluation by Dr. Levine. An MRA of the neck showed no carotid stenosis an MRI of the brain showed minimal small vessel ischemic changes. Rosaline was diagnosed as having a transient ischemic attack.   A  5/22  Zio  patch telemetry study showed sinus rhythm /minute. No arrhythmia was seen. Due to concerns for a cardioembolic etiology an implantable cardiac loop recorder was inserted in 8/22. Interrogation of the  device today shows normal sinus rhythm. No arrhythmia. No atrial fibrillation is detected.  Anticoagulation  is not  indicated in the absence of documented  atrial  fibrillation. Rosaline has requested removal of the device. Informed consent was obtained from the patient the risks including but not limited to infection bleeding requirements for reoperation inability to extract the device were all explained and all questions were answered.  I  have  recommended the following: Removal of implantable cardiac loop recorder 2/15/2024    Hyperlipidemia Hyperlipidemia represents a risk factor for atherosclerotic heart disease. There is no evidence of such today. A 6/22 exercise treadmill ECG study was normal.   The resting 8/22 electrocardiogram  shows no evidence of myocardial ischemia or infarction  The target LDL level for primary prevention is about 100. In 11/22 the serum cholesterol level was 193  HDL 59 triglycerides 102 and   The ability of high HDL level to provide protection against cardiovascular events is controversial. Rosaline has declined medical intervention for treatment. Nonpharmacological therapy, specifically diet and exercise are emphasized  as  major aspects of treatment  I have recommended the following a. Low fat low cholesterol diet. Regular aerobic exercise to the extent possible b. Target LDL level to about 100 as discussed above      Valvular heart disease The 5/22 echocardiogram revealed mild mitral insufficiency. The pulmonary systolic pressure was normal at 22 mmHg. The  left  ventricular  ejection fraction was 58%. The present cardiac physical examination is not suggestive of hemodynamically significant valvular pathology. In view of the lack of symptoms, absence of heart failure and clinical course continued monitoring without intervention is indicated this time.  I recommended the following a  no further cardiac testing for this problem at this time.     The diagnosis, prognosis, risks, options and alternatives were explained at length to the patient. All questions were answered. Issues discussed included implantable cardiac loop recorder atrial fibrillation   anticoagulation  cardiac arrhythmias atherosclerotic heart disease hyperlipidemia noninvasive cardiac testing diet and exercise    Counseling and/or coordination of care Time was a significant factor for this patient encounter. Total time spent with the patient was  25  minutes. Greater than 50% of the time was devoted to counseling and/or coordination of care .

## 2024-01-31 NOTE — PHYSICAL EXAM
[Normal] : normal conjunctiva [Normal S1, S2] : normal S1, S2 [Clear Lung Fields] : clear lung fields [Soft] : abdomen soft [Non Tender] : non-tender [Normal Bowel Sounds] : normal bowel sounds [No Rash] : no rash [No Focal Deficits] : no focal deficits [de-identified] : Appears fit in no distress lying flat [de-identified] : No neck vein distention. No carotid bruit [de-identified] : No murmur. No gallop and no diastolic sounds. [de-identified] : full range of motion.  Implantable loop recorder palpable left sternal border. Incision site healed [de-identified] : no peripheral edema. Dorsalis pedis pulses +1-2 bilaterally. Feet warm and well-perfused. No ulcerations. [de-identified] : pleasant

## 2024-01-31 NOTE — HISTORY OF PRESENT ILLNESS
[FreeTextEntry1] : 68-year-old female Routine follow-up for transient ischemic attack hyperlipidemia valvular heart disease in the presence of an implantable cardiac loop recorder. Rosaline denies symptoms of chest pain palpitations shortness of breath or syncope.  She is asking for the implantable cardiac loop recorder to be removed.  No arrhythmia has been detected.

## 2024-02-03 ENCOUNTER — NON-APPOINTMENT (OUTPATIENT)
Age: 69
End: 2024-02-03

## 2024-02-04 LAB
ANION GAP SERPL CALC-SCNC: 10 MMOL/L
BUN SERPL-MCNC: 20 MG/DL
CALCIUM SERPL-MCNC: 9.2 MG/DL
CHLORIDE SERPL-SCNC: 105 MMOL/L
CO2 SERPL-SCNC: 28 MMOL/L
CREAT SERPL-MCNC: 0.72 MG/DL
EGFR: 91 ML/MIN/1.73M2
GLUCOSE SERPL-MCNC: 102 MG/DL
HCT VFR BLD CALC: 41.2 %
HGB BLD-MCNC: 13.1 G/DL
INR PPP: 0.85 RATIO
MCHC RBC-ENTMCNC: 31.8 GM/DL
MCHC RBC-ENTMCNC: 32 PG
MCV RBC AUTO: 100.5 FL
PLATELET # BLD AUTO: 272 K/UL
POTASSIUM SERPL-SCNC: 4.6 MMOL/L
PT BLD: 9.6 SEC
RBC # BLD: 4.1 M/UL
RBC # FLD: 13.3 %
SODIUM SERPL-SCNC: 142 MMOL/L
WBC # FLD AUTO: 4.89 K/UL

## 2024-02-14 ENCOUNTER — RESULT REVIEW (OUTPATIENT)
Age: 69
End: 2024-02-14

## 2024-02-15 ENCOUNTER — TRANSCRIPTION ENCOUNTER (OUTPATIENT)
Age: 69
End: 2024-02-15

## 2024-02-15 ENCOUNTER — APPOINTMENT (OUTPATIENT)
Dept: CARDIOLOGY | Facility: CLINIC | Age: 69
End: 2024-02-15

## 2024-09-26 ENCOUNTER — APPOINTMENT (OUTPATIENT)
Dept: INTERNAL MEDICINE | Facility: CLINIC | Age: 69
End: 2024-09-26

## 2024-09-26 VITALS
HEIGHT: 64.5 IN | WEIGHT: 135 LBS | SYSTOLIC BLOOD PRESSURE: 96 MMHG | DIASTOLIC BLOOD PRESSURE: 70 MMHG | OXYGEN SATURATION: 96 % | HEART RATE: 87 BPM | BODY MASS INDEX: 22.77 KG/M2

## 2024-09-26 DIAGNOSIS — Z00.00 ENCOUNTER FOR GENERAL ADULT MEDICAL EXAMINATION W/OUT ABNORMAL FINDINGS: ICD-10-CM

## 2024-09-26 DIAGNOSIS — E55.9 VITAMIN D DEFICIENCY, UNSPECIFIED: ICD-10-CM

## 2024-09-26 PROCEDURE — G0439: CPT

## 2024-09-26 NOTE — HISTORY OF PRESENT ILLNESS
[de-identified] : Patient is a 69F with chronic lower back pain (from injury in 1970s) s/p spinal surgery, T9 compression fracture, osteoporosis (on Prolia), Hashimoto's hypothyroidism, thyroid nodule presents today for annual exam  Change of the season causes a lot of pain in the back Also with mental health changes emotional eating recently, but now under control Hip pain can be managed with exercises Moved into subsidized housing with rugs that are causing some allergies Endo: Dr. Uriarte - following for Hashimoto's and Osteoporosis on Prolia  Supplements: magnesium ashwaganda, B complex, vit C, Vit D3, lions paresh estriol cream occasionally

## 2024-09-26 NOTE — HISTORY OF PRESENT ILLNESS
[de-identified] : Patient is a 69F with chronic lower back pain (from injury in 1970s) s/p spinal surgery, T9 compression fracture, osteoporosis (on Prolia), Hashimoto's hypothyroidism, thyroid nodule presents today for annual exam  Change of the season causes a lot of pain in the back Also with mental health changes emotional eating recently, but now under control Hip pain can be managed with exercises Moved into subsidized housing with rugs that are causing some allergies Endo: Dr. Uriarte - following for Hashimoto's and Osteoporosis on Prolia  Supplements: magnesium ashwaganda, B complex, vit C, Vit D3, lions paresh estriol cream occasionally

## 2024-09-26 NOTE — HEALTH RISK ASSESSMENT
[Yes] : Yes [Monthly or less (1 pt)] : Monthly or less (1 point) [1 or 2 (0 pts)] : 1 or 2 (0 points) [Never (0 pts)] : Never (0 points) [No] : In the past 12 months have you used drugs other than those required for medical reasons? No [No falls in past year] : Patient reported no falls in the past year [0] : 2) Feeling down, depressed, or hopeless: Not at all (0) [PHQ-2 Negative - No further assessment needed] : PHQ-2 Negative - No further assessment needed [Never] : Never [NO] : No [Patient reported mammogram was normal] : Patient reported mammogram was normal [Patient reported PAP Smear was normal] : Patient reported PAP Smear was normal [Patient reported bone density results were abnormal] : Patient reported bone density results were abnormal [HIV test declined] : HIV test declined [Hepatitis C test declined] : Hepatitis C test declined [Alone] : lives alone [Retired] : retired [College] : College [] :  [# Of Children ___] : has [unfilled] children [Feels Safe at Home] : Feels safe at home [Fully functional (bathing, dressing, toileting, transferring, walking, feeding)] : Fully functional (bathing, dressing, toileting, transferring, walking, feeding) [Fully functional (using the telephone, shopping, preparing meals, housekeeping, doing laundry, using] : Fully functional and needs no help or supervision to perform IADLs (using the telephone, shopping, preparing meals, housekeeping, doing laundry, using transportation, managing medications and managing finances) [Reports normal functional visual acuity (ie: able to read med bottle)] : Reports normal functional visual acuity [Smoke Detector] : smoke detector [Carbon Monoxide Detector] : carbon monoxide detector [Safety elements used in home] : safety elements used in home [Seat Belt] :  uses seat belt [Sunscreen] : uses sunscreen [With Patient/Caregiver] : , with patient/caregiver [Patient reported colonoscopy was normal] : Patient reported colonoscopy was normal [de-identified] : socially  [de-identified] : walk [de-identified] : well balanced  [[id number]] : 0 [Reports changes in hearing] : Reports no changes in hearing [Reports changes in vision] : Reports no changes in vision [Reports changes in dental health] : Reports no changes in dental health [Travel to Developing Areas] : does not  travel to developing areas [Caregiver Concerns] : does not have caregiver concerns [MammogramDate] : 01/2022 [MammogramComments] : Jose Armando Shaikh  [PapSmearDate] : 02/2024 [PapSmearComments] : Dr Anneliese Pina  [BoneDensityDate] : 12/23 [ColonoscopyDate] : 4/15/19 [ColonoscopyComments] : f/u 10 years [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

## 2024-09-26 NOTE — PHYSICAL EXAM
[Normal Sclera/Conjunctiva] : normal sclera/conjunctiva [Normal Outer Ear/Nose] : the outer ears and nose were normal in appearance [Normal TMs] : both tympanic membranes were normal [No Lymphadenopathy] : no lymphadenopathy [Thyroid Normal, No Nodules] : the thyroid was normal and there were no nodules present [No Edema] : there was no peripheral edema [No Rash] : no rash [Normal] : affect was normal and insight and judgment were intact [de-identified] : mild throat erythema

## 2024-09-26 NOTE — PLAN
[FreeTextEntry1] : Due for mammo, has rx up to date with BD, PAP, colonoscopy rx for labs from endo, getting done next week

## 2024-09-26 NOTE — PHYSICAL EXAM
[Normal Sclera/Conjunctiva] : normal sclera/conjunctiva [Normal Outer Ear/Nose] : the outer ears and nose were normal in appearance [Normal TMs] : both tympanic membranes were normal [No Lymphadenopathy] : no lymphadenopathy [Thyroid Normal, No Nodules] : the thyroid was normal and there were no nodules present [No Edema] : there was no peripheral edema [No Rash] : no rash [Normal] : affect was normal and insight and judgment were intact [de-identified] : mild throat erythema

## 2024-09-26 NOTE — PLAN
Kimberley Murillo female   1962 61 y.o.   [id number]    Chief Complaint  Annual Exam.    History Of Present Illness  Kimberley Murillo is a 61 y.o. female presents today for annual exam       In December 2023, she started having difficulty with her right hand going numb and her fingers turning white when she is doing activities with her hands overhead, such as washing her hair.     Dr. Tesfaye saw the patient January 25, 2024 for evaluation of upper extremity arterial insufficiency.  Physical exam noted by Dr. Tesfaye 1/25/24   included nonpalpable right brachial, right radial, and right ulnar pulses.  She was noted to have a weak left brachial and radial pulse.  She had palpable femoral and popliteal pulses that seemed normal.     CTA of chest 1/27/24  shows multifocal disease within the right subclavian and axillary artery with diffuse fusiform narrowing of the distal subclavian artery with proximal occlusion of the axillary artery.  The appearance of the multifocal narrowing and vessel thickening raises the question of a nonatherosclerotic vasculitis/arteritis.  She also has mild to moderate left subclavian and axillary artery disease with smooth fusiform narrowing of the distal subclavian and proximal axillary artery.     She had bilateral temporal artery biopsies done February 5, 2024 with concerns for giant cell arteritis.  The biopsies did not show evidence of vasculitis.     Prednisone 30 mg twice daily was started 2/02/24. Blood sugars increased and she was referred to pharmacy team for management of steroid induced hyperglycemia in setting of diabetes.     Hemoglobin A1c was 7.8 December 2023-she was trying to manage her blood sugar with diet alone and had been doing better prior to starting prednisone.     Blood sugar started running as high as 500-600 after starting prednisone.  She did start metformin  mg with dinner 2/12/24.  She tried Amaryl but got severe dyspepsia from this.     She is now on  metformin XR 1000 mg with breakfast and 500 mg with dinner, Lantus insulin 25units at nighttime, Humalog KwikPen 5 to 10 units prior to meals. Monitoring BS w Dexcom device.  HbA1C May 20, 2024 is 6.9.     Prednisone was reduced to 15 mg daily May 20, 2024.  She started Actemra 162 mg subcutaneous weekly 4/24.     She is still getting some arm claudication symptoms, has not yet noted much improvement.    She did start alendronate 70 mg orally weekly because of the osteopenia (for prevention of glucocorticoid induced osteoporosis). Unfortunately she was getting epigastric and periumbilical abdominal pain after taking alendronate and this was stopped.      Presumptive prednisone taper will be as follows (managed by Dr MAL Lombardi.   May 20: 15 mg daily.  Ivette 3: 12.5 mg daily.  June 17: 10 mg daily.  July 1st: 7.5 mg daily  July 15: 5 mg daily.  July 29: 2.5 mg daily.  August 12: stop prednisone.  Check labs in 1 month: CBC with diff, CMP, ESR, CRP.  Follow up in 4-6 weeks.    Acute concerns today: Blood pressure is elevated!  Unable to obtain blood pressure in L upper extremity.  Obtained in both lower extremities-- high!   Reviewed risks of cardiovascular event and advised to report to ED.   Patient reluctant and declines at this time.      Addendum:   After visit patient did report to ED.     Chronic conditions reviewed:     Review of Systems  Review of Systems   Constitutional:  Positive for activity change, appetite change and unexpected weight change. Negative for chills, diaphoresis and fever.   Respiratory:  Negative for shortness of breath.    Cardiovascular:  Negative for chest pain.   Neurological:  Positive for numbness.       Diet: Will decrease beef consumption.   Trying to improve diet.     Exercise: now walking 0.5 miles,  states will go back up to 2 miles per day.    Dental: twice per year is recommended -- needs get back in   Ophtho: recently seen.        Screenings:   Pap- due in June 2024  ,  Mammo-  apocrine metaplasia noted- has follow up mammogram in 6/24/24   Colonoscopy- completed 3/2023- due in 2033   Immunizations:   flu  Tdap   covid  pneum  Shingles    Fears vaccines as she had bad experiences w previous vaccines-- states was sick after influenza, ill after covid.    States is considering getting the shingles vaccine.     Past Medical History  She has a past medical history of Headache, unspecified, Personal history of diseases of the blood and blood-forming organs and certain disorders involving the immune mechanism, and Personal history of pneumonia (recurrent).    Surgical History  She has a past surgical history that includes Other surgical history (11/03/2022); Other surgical history (11/03/2022); Other surgical history (11/03/2022); Other surgical history (11/03/2022); Other surgical history (11/03/2022); Other surgical history (11/03/2022); Other surgical history (11/03/2022); and BI stereotactic guided breast right localization and biopsy (Right, 12/18/2023).    Family History  No family history on file.     Social History  She reports that she has never smoked. She has never used smokeless tobacco. She reports that she does not currently use alcohol. She reports that she does not use drugs.    DEPRESSION SCREEN  Over the past 2 weeks, how often have you been bothered by any of the following problems?  Little interest or pleasure in doing things: Not at all  Feeling down, depressed, or hopeless: Nearly every day    Allergies  Meloxicam, Salsalate, Glimepiride, Statins-hmg-coa reductase inhibitors, Adhesive, and Latex    Medications  Current Outpatient Medications   Medication Instructions    Actemra ACTPen 162 mg, subcutaneous, Every 7 days    amLODIPine (NORVASC) 10 mg, oral, Daily    blood-glucose sensor (FreeStyle Chava 3 Sensor) device APPLY 1 SENSOR TO THE BACK OF THE ARM FOR CONTINUOUS BLOOD SUGAR MONITORING. REMOVE SENSOR AND REPLACE WITH NEW SENSOR EVERY 14 DAYS.    calcium carb  "and citrate-vitD3 600 mg-12.5 mcg (500 unit) tablet extended release 1 tablet, oral, 2 times daily    insulin lispro (HumaLOG KwikPen Insulin) 100 unit/mL injection 10 units with dinner    Lantus Solostar U-100 Insulin 25 Units, subcutaneous, Nightly    metFORMIN XR (Glucophage-XR) 500 mg 24 hr tablet Take 1 tablets (500 mg) by mouth in the morning and 2 tablet (1000 mg) by mouth in the afternoon. Do not crush, chew, or split.    pen needle, diabetic 32 gauge x 5/32\" needle Use 1 pen needle with insulin injections up to 5 times daily    predniSONE (Deltasone) 10 mg tablet TAKE 3 TABLETS BY MOUTH daily    predniSONE (DELTASONE) 12.5 mg, oral, Daily    Unithroid 125 mcg, oral, Daily        Objective   BP (!) 215/112 (BP Location: Right leg)   Pulse 87   Temp 36.1 °C (97 °F) (Temporal)   Ht 1.626 m (5' 4\")   Wt 91.3 kg (201 lb 3.2 oz)   SpO2 96%   BMI 34.54 kg/m²    BMI: Estimated body mass index is 34.54 kg/m² as calculated from the following:    Height as of this encounter: 1.626 m (5' 4\").    Weight as of this encounter: 91.3 kg (201 lb 3.2 oz).  Unable to get blood pressure in either arm.     R leg 215/112  Physical Exam  Physical Exam  Vitals and nursing note reviewed.   Constitutional:       Appearance: Normal appearance.   HENT:      Head: Normocephalic and atraumatic.      Nose: Nose normal.      Mouth/Throat:      Mouth: Mucous membranes are moist.      Pharynx: Oropharynx is clear.   Eyes:      Extraocular Movements: Extraocular movements intact.      Conjunctiva/sclera: Conjunctivae normal.      Pupils: Pupils are equal, round, and reactive to light.      Comments: glasses   Cardiovascular:      Rate and Rhythm: Normal rate and regular rhythm.      Pulses:           Radial pulses are 0 on the right side and 0 on the left side.      Heart sounds: Murmur heard.      Comments: Hands warm, normal capillary refill  Pulmonary:      Effort: Pulmonary effort is normal.      Breath sounds: Normal breath sounds. "   Abdominal:      General: Bowel sounds are normal.      Palpations: Abdomen is soft.      Tenderness: There is no abdominal tenderness.   Musculoskeletal:         General: Normal range of motion.      Cervical back: Neck supple.   Skin:     General: Skin is warm and dry.   Neurological:      General: No focal deficit present.      Mental Status: She is alert and oriented to person, place, and time. Mental status is at baseline.   Psychiatric:         Mood and Affect: Mood normal.         Behavior: Behavior normal.         Thought Content: Thought content normal.         Judgment: Judgment normal.         Relevant Results and Imaging  Admission on 06/04/2024, Discharged on 06/04/2024   Component Date Value Ref Range Status    Ventricular Rate 06/04/2024 80  BPM Preliminary    Atrial Rate 06/04/2024 80  BPM Preliminary    RI Interval 06/04/2024 146  ms Preliminary    QRS Duration 06/04/2024 92  ms Preliminary    QT Interval 06/04/2024 378  ms Preliminary    QTC Calculation(Bazett) 06/04/2024 435  ms Preliminary    P Axis 06/04/2024 33  degrees Preliminary    R Axis 06/04/2024 15  degrees Preliminary    T Axis 06/04/2024 33  degrees Preliminary    QRS Count 06/04/2024 13  beats Preliminary    Q Onset 06/04/2024 213  ms Preliminary    P Onset 06/04/2024 140  ms Preliminary    P Offset 06/04/2024 186  ms Preliminary    T Offset 06/04/2024 402  ms Preliminary    QTC Fredericia 06/04/2024 416  ms Preliminary    WBC 06/04/2024 7.3  4.4 - 11.3 x10*3/uL Final    nRBC 06/04/2024 0.0  0.0 - 0.0 /100 WBCs Final    RBC 06/04/2024 4.33  4.00 - 5.20 x10*6/uL Final    Hemoglobin 06/04/2024 13.9  12.0 - 16.0 g/dL Final    Hematocrit 06/04/2024 41.0  36.0 - 46.0 % Final    MCV 06/04/2024 95  80 - 100 fL Final    MCH 06/04/2024 32.1  26.0 - 34.0 pg Final    MCHC 06/04/2024 33.9  32.0 - 36.0 g/dL Final    RDW 06/04/2024 13.5  11.5 - 14.5 % Final    Platelets 06/04/2024 223  150 - 450 x10*3/uL Final    Neutrophils % 06/04/2024 42.2   40.0 - 80.0 % Final    Immature Granulocytes %, Automated 06/04/2024 0.8  0.0 - 0.9 % Final    Immature Granulocyte Count (IG) includes promyelocytes, myelocytes and metamyelocytes but does not include bands. Percent differential counts (%) should be interpreted in the context of the absolute cell counts (cells/UL).    Lymphocytes % 06/04/2024 46.4  13.0 - 44.0 % Final    Monocytes % 06/04/2024 8.6  2.0 - 10.0 % Final    Eosinophils % 06/04/2024 1.2  0.0 - 6.0 % Final    Basophils % 06/04/2024 0.8  0.0 - 2.0 % Final    Neutrophils Absolute 06/04/2024 3.07  1.20 - 7.70 x10*3/uL Final    Percent differential counts (%) should be interpreted in the context of the absolute cell counts (cells/uL).    Immature Granulocytes Absolute, Au* 06/04/2024 0.06  0.00 - 0.70 x10*3/uL Final    Lymphocytes Absolute 06/04/2024 3.38  1.20 - 4.80 x10*3/uL Final    Monocytes Absolute 06/04/2024 0.63  0.10 - 1.00 x10*3/uL Final    Eosinophils Absolute 06/04/2024 0.09  0.00 - 0.70 x10*3/uL Final    Basophils Absolute 06/04/2024 0.06  0.00 - 0.10 x10*3/uL Final    Magnesium 06/04/2024 2.00  1.60 - 2.40 mg/dL Final    Glucose 06/04/2024 81  74 - 99 mg/dL Final    Sodium 06/04/2024 142  136 - 145 mmol/L Final    Potassium 06/04/2024 3.3 (L)  3.5 - 5.3 mmol/L Final    Chloride 06/04/2024 106  98 - 107 mmol/L Final    Bicarbonate 06/04/2024 29  21 - 32 mmol/L Final    Anion Gap 06/04/2024 10  10 - 20 mmol/L Final    Urea Nitrogen 06/04/2024 13  6 - 23 mg/dL Final    Creatinine 06/04/2024 0.65  0.50 - 1.05 mg/dL Final    eGFR 06/04/2024 >90  >60 mL/min/1.73m*2 Final    Calculations of estimated GFR are performed using the 2021 CKD-EPI Study Refit equation without the race variable for the IDMS-Traceable creatinine methods.  https://jasn.asnjournals.org/content/early/2021/09/22/ASN.3177231638    Calcium 06/04/2024 10.2  8.6 - 10.3 mg/dL Final    Albumin 06/04/2024 4.6  3.4 - 5.0 g/dL Final    Alkaline Phosphatase 06/04/2024 47  33 - 136 U/L Final     Total Protein 06/04/2024 6.7  6.4 - 8.2 g/dL Final    AST 06/04/2024 19  9 - 39 U/L Final    Bilirubin, Total 06/04/2024 0.7  0.0 - 1.2 mg/dL Final    ALT 06/04/2024 32  7 - 45 U/L Final    Patients treated with Sulfasalazine may generate falsely decreased results for ALT.    Troponin I, High Sensitivity 06/04/2024 7  0 - 13 ng/L Final    Troponin I, High Sensitivity 06/04/2024 8  0 - 13 ng/L Final   Office Visit on 05/20/2024   Component Date Value Ref Range Status    POC HEMOGLOBIN A1c 05/20/2024 6.9 (A)  4.2 - 6.5 % Final   Lab on 05/14/2024   Component Date Value Ref Range Status    Glucose 05/14/2024 48 (L)  74 - 99 mg/dL Final    Sodium 05/14/2024 143  136 - 145 mmol/L Final    Potassium 05/14/2024 3.8  3.5 - 5.3 mmol/L Final    Chloride 05/14/2024 104  98 - 107 mmol/L Final    Bicarbonate 05/14/2024 30  21 - 32 mmol/L Final    Anion Gap 05/14/2024 13  10 - 20 mmol/L Final    Urea Nitrogen 05/14/2024 15  6 - 23 mg/dL Final    Creatinine 05/14/2024 0.65  0.50 - 1.05 mg/dL Final    eGFR 05/14/2024 >90  >60 mL/min/1.73m*2 Final    Calculations of estimated GFR are performed using the 2021 CKD-EPI Study Refit equation without the race variable for the IDMS-Traceable creatinine methods.  https://jasn.asnjournals.org/content/early/2021/09/22/ASN.8487867704    Calcium 05/14/2024 10.0  8.6 - 10.6 mg/dL Final    Albumin 05/14/2024 4.0  3.4 - 5.0 g/dL Final    Alkaline Phosphatase 05/14/2024 45  33 - 136 U/L Final    Total Protein 05/14/2024 6.1 (L)  6.4 - 8.2 g/dL Final    AST 05/14/2024 17  9 - 39 U/L Final    Bilirubin, Total 05/14/2024 0.6  0.0 - 1.2 mg/dL Final    ALT 05/14/2024 35  7 - 45 U/L Final    Patients treated with Sulfasalazine may generate falsely decreased results for ALT.    WBC 05/14/2024 7.9  4.4 - 11.3 x10*3/uL Final    nRBC 05/14/2024 0.8 (H)  0.0 - 0.0 /100 WBCs Final    RBC 05/14/2024 4.18  4.00 - 5.20 x10*6/uL Final    Hemoglobin 05/14/2024 12.6  12.0 - 16.0 g/dL Final    Hematocrit  05/14/2024 39.7  36.0 - 46.0 % Final    MCV 05/14/2024 95  80 - 100 fL Final    MCH 05/14/2024 30.1  26.0 - 34.0 pg Final    MCHC 05/14/2024 31.7 (L)  32.0 - 36.0 g/dL Final    RDW 05/14/2024 16.7 (H)  11.5 - 14.5 % Final    Platelets 05/14/2024 209  150 - 450 x10*3/uL Final    Neutrophils % 05/14/2024 71.1  40.0 - 80.0 % Final    Immature Granulocytes %, Automated 05/14/2024 1.5 (H)  0.0 - 0.9 % Final    Immature Granulocyte Count (IG) includes promyelocytes, myelocytes and metamyelocytes but does not include bands. Percent differential counts (%) should be interpreted in the context of the absolute cell counts (cells/UL).    Lymphocytes % 05/14/2024 17.8  13.0 - 44.0 % Final    Monocytes % 05/14/2024 8.7  2.0 - 10.0 % Final    Eosinophils % 05/14/2024 0.4  0.0 - 6.0 % Final    Basophils % 05/14/2024 0.5  0.0 - 2.0 % Final    Neutrophils Absolute 05/14/2024 5.63  1.20 - 7.70 x10*3/uL Final    Percent differential counts (%) should be interpreted in the context of the absolute cell counts (cells/uL).    Immature Granulocytes Absolute, Au* 05/14/2024 0.12  0.00 - 0.70 x10*3/uL Final    Lymphocytes Absolute 05/14/2024 1.41  1.20 - 4.80 x10*3/uL Final    Monocytes Absolute 05/14/2024 0.69  0.10 - 1.00 x10*3/uL Final    Eosinophils Absolute 05/14/2024 0.03  0.00 - 0.70 x10*3/uL Final    Basophils Absolute 05/14/2024 0.04  0.00 - 0.10 x10*3/uL Final    Sedimentation Rate 05/14/2024 1  0 - 30 mm/h Final    C-Reactive Protein 05/14/2024 <0.10  <1.00 mg/dL Final    Cholesterol 05/14/2024 256 (H)  0 - 199 mg/dL Final          Age      Desirable   Borderline High   High     0-19 Y     0 - 169       170 - 199     >/= 200    20-24 Y     0 - 189       190 - 224     >/= 225         >24 Y     0 - 199       200 - 239     >/= 240   **All ranges are based on fasting samples. Specific   therapeutic targets will vary based on patient-specific   cardiac risk.    Pediatric guidelines reference:Pediatrics 2011, 128(S5).Adult guidelines  reference: NCEP ATPIII Guidelines,WADE 2001, 258:2486-97    Venipuncture immediately after or during the administration of Metamizole may lead to falsely low results. Testing should be performed immediately prior to Metamizole dosing.    HDL-Cholesterol 05/14/2024 88.9  mg/dL Final      Age       Very Low   Low     Normal    High    0-19 Y    < 35      < 40     40-45     ----  20-24 Y    ----     < 40      >45      ----        >24 Y      ----     < 40     40-60      >60      Cholesterol/HDL Ratio 05/14/2024 2.9   Final      Ref Values  Desirable  < 3.4  High Risk  > 5.0    LDL Calculated 05/14/2024 140 (H)  <=99 mg/dL Final                                Near   Borderline      AGE      Desirable  Optimal    High     High     Very High     0-19 Y     0 - 109     ---    110-129   >/= 130     ----    20-24 Y     0 - 119     ---    120-159   >/= 160     ----      >24 Y     0 -  99   100-129  130-159   160-189     >/=190      VLDL 05/14/2024 27  0 - 40 mg/dL Final    Triglycerides 05/14/2024 135  0 - 149 mg/dL Final       Age         Desirable   Borderline High   High     Very High   0 D-90 D    19 - 174         ----         ----        ----  91 D- 9 Y     0 -  74        75 -  99     >/= 100      ----    10-19 Y     0 -  89        90 - 129     >/= 130      ----    20-24 Y     0 - 114       115 - 149     >/= 150      ----         >24 Y     0 - 149       150 - 199    200- 499    >/= 500    Venipuncture immediately after or during the administration of Metamizole may lead to falsely low results. Testing should be performed immediately prior to Metamizole dosing.    Non HDL Cholesterol 05/14/2024 167 (H)  0 - 149 mg/dL Final          Age       Desirable   Borderline High   High     Very High     0-19 Y     0 - 119       120 - 144     >/= 145    >/= 160    20-24 Y     0 - 149       150 - 189     >/= 190      ----         >24 Y    30 mg/dL above LDL Cholesterol goal     Lab on 04/18/2024   Component Date Value Ref Range Status     Sedimentation Rate 04/18/2024 3  0 - 30 mm/h Final    C-Reactive Protein 04/18/2024 <0.10  <1.00 mg/dL Final   Lab on 03/16/2024   Component Date Value Ref Range Status    Sedimentation Rate 03/16/2024 23  0 - 30 mm/h Final    C-Reactive Protein 03/16/2024 0.94  <1.00 mg/dL Final    WBC 03/16/2024 13.6 (H)  4.4 - 11.3 x10*3/uL Final    nRBC 03/16/2024 0.0  0.0 - 0.0 /100 WBCs Final    RBC 03/16/2024 4.76  4.00 - 5.20 x10*6/uL Final    Hemoglobin 03/16/2024 13.0  12.0 - 16.0 g/dL Final    Hematocrit 03/16/2024 40.2  36.0 - 46.0 % Final    MCV 03/16/2024 85  80 - 100 fL Final    MCH 03/16/2024 27.3  26.0 - 34.0 pg Final    MCHC 03/16/2024 32.3  32.0 - 36.0 g/dL Final    RDW 03/16/2024 14.9 (H)  11.5 - 14.5 % Final    Platelets 03/16/2024 266  150 - 450 x10*3/uL Final    Neutrophils % 03/16/2024 75.4  40.0 - 80.0 % Final    Immature Granulocytes %, Automated 03/16/2024 2.1 (H)  0.0 - 0.9 % Final    Immature Granulocyte Count (IG) includes promyelocytes, myelocytes and metamyelocytes but does not include bands. Percent differential counts (%) should be interpreted in the context of the absolute cell counts (cells/UL).    Lymphocytes % 03/16/2024 15.2  13.0 - 44.0 % Final    Monocytes % 03/16/2024 6.9  2.0 - 10.0 % Final    Eosinophils % 03/16/2024 0.0  0.0 - 6.0 % Final    Basophils % 03/16/2024 0.4  0.0 - 2.0 % Final    Neutrophils Absolute 03/16/2024 10.24 (H)  1.20 - 7.70 x10*3/uL Final    Percent differential counts (%) should be interpreted in the context of the absolute cell counts (cells/uL).    Immature Granulocytes Absolute, Au* 03/16/2024 0.28  0.00 - 0.70 x10*3/uL Final    Lymphocytes Absolute 03/16/2024 2.06  1.20 - 4.80 x10*3/uL Final    Monocytes Absolute 03/16/2024 0.94  0.10 - 1.00 x10*3/uL Final    Eosinophils Absolute 03/16/2024 0.00  0.00 - 0.70 x10*3/uL Final    Basophils Absolute 03/16/2024 0.06  0.00 - 0.10 x10*3/uL Final    Glucose 03/16/2024 197 (H)  74 - 99 mg/dL Final    Sodium 03/16/2024  138  136 - 145 mmol/L Final    Potassium 03/16/2024 4.5  3.5 - 5.3 mmol/L Final    Chloride 03/16/2024 99  98 - 107 mmol/L Final    Bicarbonate 03/16/2024 29  21 - 32 mmol/L Final    Anion Gap 03/16/2024 15  10 - 20 mmol/L Final    Urea Nitrogen 03/16/2024 16  6 - 23 mg/dL Final    Creatinine 03/16/2024 0.63  0.50 - 1.05 mg/dL Final    eGFR 03/16/2024 >90  >60 mL/min/1.73m*2 Final    Calculations of estimated GFR are performed using the 2021 CKD-EPI Study Refit equation without the race variable for the IDMS-Traceable creatinine methods.  https://jasn.asnjournals.org/content/early/2021/09/22/ASN.6894151508    Calcium 03/16/2024 10.3  8.6 - 10.6 mg/dL Final    Albumin 03/16/2024 3.7  3.4 - 5.0 g/dL Final    Alkaline Phosphatase 03/16/2024 78  33 - 136 U/L Final    Total Protein 03/16/2024 6.1 (L)  6.4 - 8.2 g/dL Final    AST 03/16/2024 12  9 - 39 U/L Final    Bilirubin, Total 03/16/2024 0.6  0.0 - 1.2 mg/dL Final    ALT 03/16/2024 28  7 - 45 U/L Final    Patients treated with Sulfasalazine may generate falsely decreased results for ALT.    Cholesterol 03/16/2024 223 (H)  0 - 199 mg/dL Final          Age      Desirable   Borderline High   High     0-19 Y     0 - 169       170 - 199     >/= 200    20-24 Y     0 - 189       190 - 224     >/= 225         >24 Y     0 - 199       200 - 239     >/= 240   **All ranges are based on fasting samples. Specific   therapeutic targets will vary based on patient-specific   cardiac risk.    Pediatric guidelines reference:Pediatrics 2011, 128(S5).Adult guidelines reference: NCEP ATPIII Guidelines,WADE 2001, 258:2486-97    Venipuncture immediately after or during the administration of Metamizole may lead to falsely low results. Testing should be performed immediately prior to Metamizole dosing.    HDL-Cholesterol 03/16/2024 65.7  mg/dL Final      Age       Very Low   Low     Normal    High    0-19 Y    < 35      < 40     40-45     ----  20-24 Y    ----     < 40      >45      ----         >24 Y      ----     < 40     40-60      >60      Cholesterol/HDL Ratio 03/16/2024 3.4   Final      Ref Values  Desirable  < 3.4  High Risk  > 5.0    LDL Calculated 03/16/2024 128 (H)  <=99 mg/dL Final                                Near   Borderline      AGE      Desirable  Optimal    High     High     Very High     0-19 Y     0 - 109     ---    110-129   >/= 130     ----    20-24 Y     0 - 119     ---    120-159   >/= 160     ----      >24 Y     0 -  99   100-129  130-159   160-189     >/=190      VLDL 03/16/2024 29  0 - 40 mg/dL Final    Triglycerides 03/16/2024 147  0 - 149 mg/dL Final       Age         Desirable   Borderline High   High     Very High   0 D-90 D    19 - 174         ----         ----        ----  91 D- 9 Y     0 -  74        75 -  99     >/= 100      ----    10-19 Y     0 -  89        90 - 129     >/= 130      ----    20-24 Y     0 - 114       115 - 149     >/= 150      ----         >24 Y     0 - 149       150 - 199    200- 499    >/= 500    Venipuncture immediately after or during the administration of Metamizole may lead to falsely low results. Testing should be performed immediately prior to Metamizole dosing.    Non HDL Cholesterol 03/16/2024 157 (H)  0 - 149 mg/dL Final          Age       Desirable   Borderline High   High     Very High     0-19 Y     0 - 119       120 - 144     >/= 145    >/= 160    20-24 Y     0 - 149       150 - 189     >/= 190      ----         >24 Y    30 mg/dL above LDL Cholesterol goal     Lab on 02/22/2024   Component Date Value Ref Range Status    Sedimentation Rate 02/22/2024 7  0 - 30 mm/h Final    C-Reactive Protein 02/22/2024 1.27 (H)  <1.00 mg/dL Final    T-SPOT. TB Interpretation 02/22/2024 Negative  Negative Final       A negative test result does not exclude the possibility  of exposure to or infection with Mycobacterium  tuberculosis (M. tuberculosis). Patients with recent  exposure to TB infected individuals exhibiting a  negative T-SPOT.TB result should  be considered for  retesting within 6 weeks or if other relevant clinical  symptoms indicate. Results from T-SPOT.TB testing must  be used in conjunction with each individual's  epidemiological history, current medical status,  and results of other diagnostic evaluations.            The T-SPOT.TB test is qualitative and results are  reported as positive, borderline, or negative, given  that the test controls perform as expected. In line  with the Centers for Disease Control and Prevention's  2010 recommendation to report quantitative measurements  alongside the qualitative result, the laboratory  provides spot counts for informational purposes only.  The T-SPOT.TB test should not be interpreted as a  quantitative test.       Panel A Spot Count 02/22/2024 0   Final    Panel B Spot Count 02/22/2024 0   Final    NIL(NEG) Control Spot Count 02/22/2024 Passed   Final    POS Control Spot Count 02/22/2024 Passed   Final       For additional information, please refer to  http://education.Syndero/faq/VUN429     (This link is being provided for informational/  educational purposes only.)           Hepatitis C AB 02/22/2024 Nonreactive  Nonreactive Final    Results from patients taking biotin supplements or receiving high-dose biotin therapy should be interpreted with caution due to possible interference with this test. Providers may contact their local laboratory for further information.    Hepatitis B Surface AG 02/22/2024 Nonreactive  Nonreactive Final    Biotin interference may cause falsely decreased results. Patients taking a Biotin dose of up to 5 mg/day should refrain from taking Biotin for 24 hours before sample collection. Providers may contact their local laboratory for  further information.    Hepatitis B Core AB- Total 02/22/2024 Nonreactive  Nonreactive Final    Hepatitis B Surface AB 02/22/2024 <3.1  <10.0 mIU/mL Final    Interpretive Criteria:                         <10 mIU/mL    Nonreactive                           >=10 mIU/mL    Reactive     Biotin interference may cause falsely decreased results. Patients taking a Biotin dose of up to 5 mg/day should refrain from taking Biotin for 24 hours before sample collection. Providers may contact their local laboratory for further information.   Hospital Outpatient Visit on 02/05/2024   Component Date Value Ref Range Status    Ventricular Rate 02/08/2024 72  BPM Final    Atrial Rate 02/08/2024 72  BPM Final    AK Interval 02/08/2024 148  ms Final    QRS Duration 02/08/2024 100  ms Final    QT Interval 02/08/2024 400  ms Final    QTC Calculation(Bazett) 02/08/2024 438  ms Final    P Axis 02/08/2024 22  degrees Final    R Axis 02/08/2024 6  degrees Final    T Axis 02/08/2024 19  degrees Final    QRS Count 02/08/2024 11  beats Final    Q Onset 02/08/2024 209  ms Final    P Onset 02/08/2024 135  ms Final    P Offset 02/08/2024 182  ms Final    T Offset 02/08/2024 409  ms Final    QTC Fredericia 02/08/2024 425  ms Final   Admission on 02/05/2024, Discharged on 02/05/2024   Component Date Value Ref Range Status    POCT Glucose 02/05/2024 163 (H)  74 - 99 mg/dL Final    Case Report 02/05/2024    Final                    Value:Surgical Pathology                                Case: [id number]                                  Authorizing Provider:  Burke Tesfaye DO        Collected:           02/05/2024 1116              Ordering Location:     [facility] OR Received:            02/05/2024 1254              Pathologist:           Zaki Herrera MD                                                             Specimens:   A) - TEMPORAL ARTERY BIOPSY, LEFT TEMPORAL ARTERY BIOPSY                                            B) - TEMPORAL ARTERY BIOPSY, RIGHT TEMPORAL ARTERY BIOPSY                                  FINAL DIAGNOSIS 02/05/2024    Final                    Value:This result contains rich text formatting which cannot be displayed here.      02/05/2024     Final                    Value:This result contains rich text formatting which cannot be displayed here.    Clinical History 02/05/2024    Final                    Value:This result contains rich text formatting which cannot be displayed here.    Gross Description 02/05/2024    Final                    Value:This result contains rich text formatting which cannot be displayed here.   Lab on 01/31/2024   Component Date Value Ref Range Status    Glucose 01/31/2024 120 (H)  74 - 99 mg/dL Final    Sodium 01/31/2024 140  136 - 145 mmol/L Final    Potassium 01/31/2024 3.9  3.5 - 5.3 mmol/L Final    Chloride 01/31/2024 103  98 - 107 mmol/L Final    Bicarbonate 01/31/2024 27  21 - 32 mmol/L Final    Anion Gap 01/31/2024 14  10 - 20 mmol/L Final    Urea Nitrogen 01/31/2024 12  6 - 23 mg/dL Final    Creatinine 01/31/2024 0.73  0.50 - 1.05 mg/dL Final    eGFR 01/31/2024 >90  >60 mL/min/1.73m*2 Final    Calculations of estimated GFR are performed using the 2021 CKD-EPI Study Refit equation without the race variable for the IDMS-Traceable creatinine methods.  https://jasn.asnjournals.org/content/early/2021/09/22/ASN.9158744179    Calcium 01/31/2024 10.2  8.6 - 10.3 mg/dL Final    Sedimentation Rate 01/31/2024 39 (H)  0 - 30 mm/h Final    C-Reactive Protein 01/31/2024 6.11 (H)  <1.00 mg/dL Final    JASSI 01/31/2024 Negative  Negative Final    The Antinuclear Antibody (JASSI) test was performed using  indirect immunofluorescence assay with HEp-2 cells slide.    Rheumatoid Factor 01/31/2024 18 (H)  0 - 15 IU/mL Final    Citrulline Antibody, IgG 01/31/2024 <1  <3 U/mL Final    NEGATIVE < 3 U/ML  POSITIVE >=3 U/ML    IgG 4 01/31/2024 17  3 - 200 mg/dL Final    WBC 01/31/2024 9.1  4.4 - 11.3 x10*3/uL Final    nRBC 01/31/2024 0.0  0.0 - 0.0 /100 WBCs Final    RBC 01/31/2024 4.56  4.00 - 5.20 x10*6/uL Final    Hemoglobin 01/31/2024 12.3  12.0 - 16.0 g/dL Final    Hematocrit 01/31/2024 39.3  36.0 - 46.0 % Final    MCV 01/31/2024 86  80 - 100  fL Final    MCH 01/31/2024 27.0  26.0 - 34.0 pg Final    MCHC 01/31/2024 31.3 (L)  32.0 - 36.0 g/dL Final    RDW 01/31/2024 13.7  11.5 - 14.5 % Final    Platelets 01/31/2024 379  150 - 450 x10*3/uL Final    Neutrophils % 01/31/2024 63.4  40.0 - 80.0 % Final    Immature Granulocytes %, Automated 01/31/2024 0.2  0.0 - 0.9 % Final    Immature Granulocyte Count (IG) includes promyelocytes, myelocytes and metamyelocytes but does not include bands. Percent differential counts (%) should be interpreted in the context of the absolute cell counts (cells/UL).    Lymphocytes % 01/31/2024 26.4  13.0 - 44.0 % Final    Monocytes % 01/31/2024 7.8  2.0 - 10.0 % Final    Eosinophils % 01/31/2024 1.3  0.0 - 6.0 % Final    Basophils % 01/31/2024 0.9  0.0 - 2.0 % Final    Neutrophils Absolute 01/31/2024 5.75  1.20 - 7.70 x10*3/uL Final    Percent differential counts (%) should be interpreted in the context of the absolute cell counts (cells/uL).    Immature Granulocytes Absolute, Au* 01/31/2024 0.02  0.00 - 0.70 x10*3/uL Final    Lymphocytes Absolute 01/31/2024 2.40  1.20 - 4.80 x10*3/uL Final    Monocytes Absolute 01/31/2024 0.71  0.10 - 1.00 x10*3/uL Final    Eosinophils Absolute 01/31/2024 0.12  0.00 - 0.70 x10*3/uL Final    Basophils Absolute 01/31/2024 0.08  0.00 - 0.10 x10*3/uL Final    Myeloperoxidase (MPO) Ab, IgG 01/31/2024 0  0 - 19 AU/mL Final    INTERPRETIVE INFORMATION: Myeloperoxidase Abs, IgG      19 AU/mL or Less ......... Negative    20-25 AU/mL .............. Equivocal    26 AU/mL or Greater ...... Positive    Approximately 90% of patients with a P-ANCA pattern by IFA have   antibodies specific for MPO.    Serine Proteinase 3 (PR3) Ab, IgG 01/31/2024 0  0 - 19 AU/mL Final    INTERPRETIVE INFORMATION: Serine Proteinase 3, IgG      19 AU/mL or Less ........ Negative    20-25 AU/mL ............. Equivocal    26 AU/mL or Greater ..... Positive    Approximately 85% of patients with a C-ANCA pattern by IFA have   antibodies  specific for PR3.    ANCA IFA Titer 01/31/2024 <1:20  <1:20 Final    ANCA IFA Pattern 01/31/2024 None Detected  None Detected Final    INTERPRETIVE INFORMATION: ANCA IFA Pattern     Neutrophil Cytoplasmic Antibodies (C-ANCA = granular cytoplasmic   staining, P-ANCA = perinuclear staining) are found in the serum of   over 90 percent of patients with certain necrotizing systemic   vasculitides, and usually in less than 5 percent of patients with   collagen vascular disease or arthritis.  Performed By: [facility]  [address] [place] [id number]  : Delmer Mackenzie MD, PhD  CLIA Number: [id number]   Lab on 12/29/2023   Component Date Value Ref Range Status    Glucose 12/29/2023 134 (H)  74 - 99 mg/dL Final    Sodium 12/29/2023 139  136 - 145 mmol/L Final    Potassium 12/29/2023 4.2  3.5 - 5.3 mmol/L Final    Chloride 12/29/2023 102  98 - 107 mmol/L Final    Bicarbonate 12/29/2023 31  21 - 32 mmol/L Final    Anion Gap 12/29/2023 10  10 - 20 mmol/L Final    Urea Nitrogen 12/29/2023 9  6 - 23 mg/dL Final    Creatinine 12/29/2023 0.61  0.50 - 1.05 mg/dL Final    eGFR 12/29/2023 >90  >60 mL/min/1.73m*2 Final    Calculations of estimated GFR are performed using the 2021 CKD-EPI Study Refit equation without the race variable for the IDMS-Traceable creatinine methods.  https://jasn.asnjournals.org/content/early/2021/09/22/ASN.6806254146    Calcium 12/29/2023 10.2  8.6 - 10.6 mg/dL Final    Albumin 12/29/2023 3.9  3.4 - 5.0 g/dL Final    Alkaline Phosphatase 12/29/2023 127  33 - 136 U/L Final    Total Protein 12/29/2023 6.9  6.4 - 8.2 g/dL Final    AST 12/29/2023 12  9 - 39 U/L Final    Bilirubin, Total 12/29/2023 0.5  0.0 - 1.2 mg/dL Final    ALT 12/29/2023 13  7 - 45 U/L Final    Patients treated with Sulfasalazine may generate falsely decreased results for ALT.    Hemoglobin A1C 12/29/2023 7.8 (H)  see below % Final    Estimated Average Glucose 12/29/2023 177  Not Established mg/dL  Final   There may be more visits with results that are not included.     No images are attached to the encounter.        Assessment and Plan  Assessment/Plan   Problem List Items Addressed This Visit             ICD-10-CM    Type 2 diabetes mellitus without complication, without long-term current use of insulin (Multi) E11.9     -managed on  metfomin, lantus, humalog-- follows w pharmacy regularly for dosage adjustment while tapering off of prednisone    -Ophthalmology follow up yearly in [place]   5/20/24: A1c 6.9           Relevant Medications    insulin glargine (Lantus Solostar U-100 Insulin) 100 unit/mL (3 mL) pen    insulin lispro (HumaLOG KwikPen Insulin) 100 unit/mL injection    Hypertension - Primary I10     - in past Trialed on hydrochlorothiazide for HTN, unable to tolerate due to SE of leg cramps.   - was on lisinopril 2.5 mg daily (started 9/15/23), stopped due to SE fatigue  Now very hypertensive: instructed to report to ED- pt declines   Start amlodipine 5 mg - pt very sensitive to medication in past therefore starting with only 5 mg -- still strongly advise ER visit.         Acquired hypothyroidism E03.9     -Managed by unithroid 125 mcg daily.    compliant w medications.          Statin intolerance Z78.9     Tried statin and zetia-- does not tolerate statin due to myalgias.  After 3 doses of zetia experienced hematuria which resolved after stopping medication           Giant cell arteritis (Multi) M31.6     Managed by Dr MONISHA Lee   GCA-has large vessel involvement without cranial arteritis (had negative bilateral temporary biopsies).  -had right arm claudication since 12/23.  -Initial labs remarkable for ESR of 39 and CRP 6.11 (normal less than 1).  1/26/26 ct angio chest w/wout contrast- multifocal narrowing and vessel thickening in the right subclavian artery, including a long segment circumferential narrowing measuring 1.5 cm (with 75% narrowing).  There is abrupt narrowing and occlusion  of the proximal right axillary artery.  There is mild to moderate narrowing of the distal left subclavian artery and mild to moderate narrowing of the proximal axillary artery on the left .  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   -2/2/2024 started prednisone 30 mg twice daily, tapered to 15 mg d on 5/20/24  Presumptive prednisone taper will be as follows:  May 20: 15 mg daily.  Ivette 3: 12.5 mg daily.  June 17: 10 mg daily.  July 1st: 7.5 mg daily  July 15: 5 mg daily.  July 29: 2.5 mg daily.  August 12: stop prednisone.  -cont on actemra 162 weekly (started 4/2024)  Will follow up w Vascular dr Tesfaye          Other Visit Diagnoses         Codes    Hyperglycemia     R73.9    Relevant Medications    insulin glargine (Lantus Solostar U-100 Insulin) 100 unit/mL (3 mL) pen                [FreeTextEntry1] : Due for mammo, has rx up to date with [facility], PAP, colonoscopy rx for labs from endo, getting done next week

## 2024-09-26 NOTE — HEALTH RISK ASSESSMENT
[Yes] : Yes [Monthly or less (1 pt)] : Monthly or less (1 point) [1 or 2 (0 pts)] : 1 or 2 (0 points) [Never (0 pts)] : Never (0 points) [No] : In the past 12 months have you used drugs other than those required for medical reasons? No [No falls in past year] : Patient reported no falls in the past year [0] : 2) Feeling down, depressed, or hopeless: Not at all (0) [PHQ-2 Negative - No further assessment needed] : PHQ-2 Negative - No further assessment needed [Never] : Never [NO] : No [Patient reported mammogram was normal] : Patient reported mammogram was normal [Patient reported PAP Smear was normal] : Patient reported PAP Smear was normal [Patient reported bone density results were abnormal] : Patient reported bone density results were abnormal [HIV test declined] : HIV test declined [Hepatitis C test declined] : Hepatitis C test declined [Alone] : lives alone [Retired] : retired [College] : College [] :  [# Of Children ___] : has [unfilled] children [Feels Safe at Home] : Feels safe at home [Fully functional (bathing, dressing, toileting, transferring, walking, feeding)] : Fully functional (bathing, dressing, toileting, transferring, walking, feeding) [Fully functional (using the telephone, shopping, preparing meals, housekeeping, doing laundry, using] : Fully functional and needs no help or supervision to perform IADLs (using the telephone, shopping, preparing meals, housekeeping, doing laundry, using transportation, managing medications and managing finances) [Reports normal functional visual acuity (ie: able to read med bottle)] : Reports normal functional visual acuity [Smoke Detector] : smoke detector [Carbon Monoxide Detector] : carbon monoxide detector [Safety elements used in home] : safety elements used in home [Seat Belt] :  uses seat belt [Sunscreen] : uses sunscreen [With Patient/Caregiver] : , with patient/caregiver [Patient reported colonoscopy was normal] : Patient reported colonoscopy was normal [de-identified] : socially  [de-identified] : walk [de-identified] : well balanced  [[id number]] : 0 [Reports changes in hearing] : Reports no changes in hearing [Reports changes in vision] : Reports no changes in vision [Reports changes in dental health] : Reports no changes in dental health [Travel to Developing Areas] : does not  travel to developing areas [Caregiver Concerns] : does not have caregiver concerns [MammogramDate] : 01/2022 [MammogramComments] : Jose Armando Shaikh  [PapSmearDate] : 02/2024 [PapSmearComments] : Dr Anneliese Pina  [BoneDensityDate] : 12/23 [ColonoscopyDate] : 4/15/19 [ColonoscopyComments] : f/u 10 years [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

## 2024-10-10 ENCOUNTER — TRANSCRIPTION ENCOUNTER (OUTPATIENT)
Age: 69
End: 2024-10-10